# Patient Record
Sex: FEMALE | Race: WHITE | NOT HISPANIC OR LATINO | Employment: OTHER | URBAN - METROPOLITAN AREA
[De-identification: names, ages, dates, MRNs, and addresses within clinical notes are randomized per-mention and may not be internally consistent; named-entity substitution may affect disease eponyms.]

---

## 2024-01-01 ENCOUNTER — HOSPITAL ENCOUNTER (INPATIENT)
Facility: HOSPITAL | Age: 74
LOS: 2 days | End: 2024-08-29
Attending: EMERGENCY MEDICINE | Admitting: INTERNAL MEDICINE
Payer: COMMERCIAL

## 2024-01-01 ENCOUNTER — APPOINTMENT (OUTPATIENT)
Dept: CARDIOLOGY | Facility: HOSPITAL | Age: 74
End: 2024-01-01
Payer: COMMERCIAL

## 2024-01-01 ENCOUNTER — APPOINTMENT (OUTPATIENT)
Dept: RADIOLOGY | Facility: HOSPITAL | Age: 74
End: 2024-01-01
Payer: COMMERCIAL

## 2024-01-01 ENCOUNTER — APPOINTMENT (OUTPATIENT)
Dept: SURGERY | Facility: CLINIC | Age: 74
End: 2024-01-01
Payer: COMMERCIAL

## 2024-01-01 VITALS
WEIGHT: 61.2 LBS | DIASTOLIC BLOOD PRESSURE: 61 MMHG | TEMPERATURE: 95 F | OXYGEN SATURATION: 100 % | HEART RATE: 95 BPM | BODY MASS INDEX: 14.16 KG/M2 | RESPIRATION RATE: 16 BRPM | SYSTOLIC BLOOD PRESSURE: 98 MMHG | HEIGHT: 55 IN

## 2024-01-01 DIAGNOSIS — I48.91 A-FIB (MULTI): Primary | ICD-10-CM

## 2024-01-01 LAB
ABO GROUP (TYPE) IN BLOOD: NORMAL
ALBUMIN SERPL BCP-MCNC: 2 G/DL (ref 3.4–5)
ALBUMIN SERPL BCP-MCNC: 2.8 G/DL (ref 3.4–5)
ALP SERPL-CCNC: 125 U/L (ref 33–136)
ALP SERPL-CCNC: 90 U/L (ref 33–136)
ALT SERPL W P-5'-P-CCNC: 10 U/L (ref 7–45)
ALT SERPL W P-5'-P-CCNC: 11 U/L (ref 7–45)
ANION GAP BLDA CALCULATED.4IONS-SCNC: 16 MMO/L (ref 10–25)
ANION GAP BLDV CALCULATED.4IONS-SCNC: 17 MMOL/L (ref 10–25)
ANION GAP BLDV CALCULATED.4IONS-SCNC: 18 MMOL/L (ref 10–25)
ANION GAP SERPL CALC-SCNC: 14 MMOL/L (ref 10–20)
ANION GAP SERPL CALC-SCNC: 18 MMOL/L (ref 10–20)
ANTIBODY SCREEN: NORMAL
APTT PPP: 38 SECONDS (ref 27–38)
AST SERPL W P-5'-P-CCNC: 13 U/L (ref 9–39)
AST SERPL W P-5'-P-CCNC: 16 U/L (ref 9–39)
ATRIAL RATE: 0 BPM
ATRIAL RATE: 96 BPM
BASE EXCESS BLDA CALC-SCNC: -8.9 MMOL/L (ref -2–3)
BASE EXCESS BLDV CALC-SCNC: -5.7 MMOL/L (ref -2–3)
BASE EXCESS BLDV CALC-SCNC: -8.6 MMOL/L (ref -2–3)
BASOPHILS # BLD AUTO: 0.07 X10*3/UL (ref 0–0.1)
BASOPHILS # BLD AUTO: 0.11 X10*3/UL (ref 0–0.1)
BASOPHILS NFR BLD AUTO: 0.9 %
BASOPHILS NFR BLD AUTO: 1 %
BILIRUB DIRECT SERPL-MCNC: 0.3 MG/DL (ref 0–0.3)
BILIRUB SERPL-MCNC: 0.9 MG/DL (ref 0–1.2)
BILIRUB SERPL-MCNC: 1 MG/DL (ref 0–1.2)
BLOOD EXPIRATION DATE: NORMAL
BNP SERPL-MCNC: 269 PG/ML (ref 0–99)
BODY TEMPERATURE: 37 DEGREES CELSIUS
BUN SERPL-MCNC: 12 MG/DL (ref 6–23)
BUN SERPL-MCNC: 15 MG/DL (ref 6–23)
BURR CELLS BLD QL SMEAR: NORMAL
CA-I BLDA-SCNC: 1.16 MMOL/L (ref 1.1–1.33)
CA-I BLDV-SCNC: 1.14 MMOL/L (ref 1.1–1.33)
CA-I BLDV-SCNC: 1.23 MMOL/L (ref 1.1–1.33)
CALCIUM SERPL-MCNC: 7.2 MG/DL (ref 8.6–10.3)
CALCIUM SERPL-MCNC: 8 MG/DL (ref 8.6–10.3)
CARDIAC TROPONIN I PNL SERPL HS: 279 NG/L (ref 0–13)
CARDIAC TROPONIN I PNL SERPL HS: 300 NG/L (ref 0–13)
CARDIAC TROPONIN I PNL SERPL HS: 327 NG/L (ref 0–13)
CHLORIDE BLDA-SCNC: 115 MMOL/L (ref 98–107)
CHLORIDE BLDV-SCNC: 113 MMOL/L (ref 98–107)
CHLORIDE BLDV-SCNC: 115 MMOL/L (ref 98–107)
CHLORIDE SERPL-SCNC: 114 MMOL/L (ref 98–107)
CHLORIDE SERPL-SCNC: 118 MMOL/L (ref 98–107)
CO2 SERPL-SCNC: 15 MMOL/L (ref 21–32)
CO2 SERPL-SCNC: 18 MMOL/L (ref 21–32)
CREAT SERPL-MCNC: 0.71 MG/DL (ref 0.5–1.05)
CREAT SERPL-MCNC: 0.9 MG/DL (ref 0.5–1.05)
DISPENSE STATUS: NORMAL
EGFRCR SERPLBLD CKD-EPI 2021: 68 ML/MIN/1.73M*2
EGFRCR SERPLBLD CKD-EPI 2021: 90 ML/MIN/1.73M*2
EOSINOPHIL # BLD AUTO: 0.05 X10*3/UL (ref 0–0.4)
EOSINOPHIL # BLD AUTO: 0.07 X10*3/UL (ref 0–0.4)
EOSINOPHIL NFR BLD AUTO: 0.4 %
EOSINOPHIL NFR BLD AUTO: 0.9 %
ERYTHROCYTE [DISTWIDTH] IN BLOOD BY AUTOMATED COUNT: 17.6 % (ref 11.5–14.5)
ERYTHROCYTE [DISTWIDTH] IN BLOOD BY AUTOMATED COUNT: 17.7 % (ref 11.5–14.5)
GLUCOSE BLD MANUAL STRIP-MCNC: 102 MG/DL (ref 74–99)
GLUCOSE BLDA-MCNC: 111 MG/DL (ref 74–99)
GLUCOSE BLDV-MCNC: 125 MG/DL (ref 74–99)
GLUCOSE BLDV-MCNC: 85 MG/DL (ref 74–99)
GLUCOSE SERPL-MCNC: 129 MG/DL (ref 74–99)
GLUCOSE SERPL-MCNC: 88 MG/DL (ref 74–99)
HCO3 BLDA-SCNC: 15.1 MMOL/L (ref 22–26)
HCO3 BLDV-SCNC: 14.9 MMOL/L (ref 22–26)
HCO3 BLDV-SCNC: 19.3 MMOL/L (ref 22–26)
HCT VFR BLD AUTO: 19.9 % (ref 36–46)
HCT VFR BLD AUTO: 26.2 % (ref 36–46)
HCT VFR BLD EST: 21 % (ref 36–46)
HCT VFR BLD EST: 22 % (ref 36–46)
HCT VFR BLD EST: 26 % (ref 36–46)
HGB BLD-MCNC: 6.6 G/DL (ref 12–16)
HGB BLD-MCNC: 8.5 G/DL (ref 12–16)
HGB BLDA-MCNC: 7.3 G/DL (ref 12–16)
HGB BLDV-MCNC: 6.9 G/DL (ref 12–16)
HGB BLDV-MCNC: 8.6 G/DL (ref 12–16)
HYPOCHROMIA BLD QL SMEAR: NORMAL
IMM GRANULOCYTES # BLD AUTO: 0.06 X10*3/UL (ref 0–0.5)
IMM GRANULOCYTES # BLD AUTO: 0.16 X10*3/UL (ref 0–0.5)
IMM GRANULOCYTES NFR BLD AUTO: 0.8 % (ref 0–0.9)
IMM GRANULOCYTES NFR BLD AUTO: 1.4 % (ref 0–0.9)
INHALED O2 CONCENTRATION: 21 %
INHALED O2 CONCENTRATION: 28 %
INHALED O2 CONCENTRATION: 28 %
INR PPP: 1.6 (ref 0.9–1.1)
LACTATE BLDA-SCNC: 5.2 MMOL/L (ref 0.4–2)
LACTATE BLDV-SCNC: 1.7 MMOL/L (ref 0.4–2)
LACTATE BLDV-SCNC: 5.5 MMOL/L (ref 0.4–2)
LACTATE SERPL-SCNC: 5.2 MMOL/L (ref 0.4–2)
LACTATE SERPL-SCNC: 5.5 MMOL/L (ref 0.4–2)
LYMPHOCYTES # BLD AUTO: 0.47 X10*3/UL (ref 0.8–3)
LYMPHOCYTES # BLD AUTO: 1.52 X10*3/UL (ref 0.8–3)
LYMPHOCYTES NFR BLD AUTO: 13.6 %
LYMPHOCYTES NFR BLD AUTO: 6.3 %
MAGNESIUM SERPL-MCNC: 1.9 MG/DL (ref 1.6–2.4)
MCH RBC QN AUTO: 37.1 PG (ref 26–34)
MCH RBC QN AUTO: 37.3 PG (ref 26–34)
MCHC RBC AUTO-ENTMCNC: 32.4 G/DL (ref 32–36)
MCHC RBC AUTO-ENTMCNC: 33.2 G/DL (ref 32–36)
MCV RBC AUTO: 112 FL (ref 80–100)
MCV RBC AUTO: 114 FL (ref 80–100)
MONOCYTES # BLD AUTO: 0.74 X10*3/UL (ref 0.05–0.8)
MONOCYTES # BLD AUTO: 1.14 X10*3/UL (ref 0.05–0.8)
MONOCYTES NFR BLD AUTO: 10.2 %
MONOCYTES NFR BLD AUTO: 9.8 %
NEUTROPHILS # BLD AUTO: 6.11 X10*3/UL (ref 1.6–5.5)
NEUTROPHILS # BLD AUTO: 8.18 X10*3/UL (ref 1.6–5.5)
NEUTROPHILS NFR BLD AUTO: 73.4 %
NEUTROPHILS NFR BLD AUTO: 81.3 %
NRBC BLD-RTO: 0 /100 WBCS (ref 0–0)
NRBC BLD-RTO: 0 /100 WBCS (ref 0–0)
OXYHGB MFR BLDA: 97.7 % (ref 94–98)
OXYHGB MFR BLDV: 47.6 % (ref 45–75)
OXYHGB MFR BLDV: 97 % (ref 45–75)
P AXIS: 59 DEGREES
P AXIS: 68 DEGREES
PCO2 BLDA: 25 MM HG (ref 38–42)
PCO2 BLDV: 23 MM HG (ref 41–51)
PCO2 BLDV: 35 MM HG (ref 41–51)
PH BLDA: 7.39 PH (ref 7.38–7.42)
PH BLDV: 7.35 PH (ref 7.33–7.43)
PH BLDV: 7.42 PH (ref 7.33–7.43)
PLATELET # BLD AUTO: 174 X10*3/UL (ref 150–450)
PLATELET # BLD AUTO: 223 X10*3/UL (ref 150–450)
PO2 BLDA: 142 MM HG (ref 85–95)
PO2 BLDV: 148 MM HG (ref 35–45)
PO2 BLDV: 40 MM HG (ref 35–45)
POLYCHROMASIA BLD QL SMEAR: NORMAL
POTASSIUM BLDA-SCNC: 3.6 MMOL/L (ref 3.5–5.3)
POTASSIUM BLDV-SCNC: 2 MMOL/L (ref 3.5–5.3)
POTASSIUM BLDV-SCNC: 3.9 MMOL/L (ref 3.5–5.3)
POTASSIUM SERPL-SCNC: 2.2 MMOL/L (ref 3.5–5.3)
POTASSIUM SERPL-SCNC: 3.9 MMOL/L (ref 3.5–5.3)
PR INTERVAL: 82 MS
PR INTERVAL: 95 MS
PRODUCT BLOOD TYPE: 9500
PRODUCT CODE: NORMAL
PROT SERPL-MCNC: 3.7 G/DL (ref 6.4–8.2)
PROT SERPL-MCNC: 5.4 G/DL (ref 6.4–8.2)
PROTHROMBIN TIME: 18 SECONDS (ref 9.8–12.8)
Q ONSET: 252 MS
Q ONSET: 253 MS
QRS COUNT: 16 BEATS
QRS COUNT: 19 BEATS
QRS DURATION: 80 MS
QRS DURATION: 98 MS
QT INTERVAL: 320 MS
QT INTERVAL: 417 MS
QTC CALCULATION(BAZETT): 445 MS
QTC CALCULATION(BAZETT): 522 MS
QTC FREDERICIA: 398 MS
QTC FREDERICIA: 484 MS
R AXIS: 74 DEGREES
R AXIS: 83 DEGREES
RBC # BLD AUTO: 1.77 X10*6/UL (ref 4–5.2)
RBC # BLD AUTO: 2.29 X10*6/UL (ref 4–5.2)
RBC MORPH BLD: NORMAL
RH FACTOR (ANTIGEN D): NORMAL
SAO2 % BLDA: 100 % (ref 94–100)
SAO2 % BLDV: 49 % (ref 45–75)
SAO2 % BLDV: 99 % (ref 45–75)
SARS-COV-2 RNA RESP QL NAA+PROBE: NOT DETECTED
SODIUM BLDA-SCNC: 142 MMOL/L (ref 136–145)
SODIUM BLDV-SCNC: 142 MMOL/L (ref 136–145)
SODIUM BLDV-SCNC: 149 MMOL/L (ref 136–145)
SODIUM SERPL-SCNC: 143 MMOL/L (ref 136–145)
SODIUM SERPL-SCNC: 148 MMOL/L (ref 136–145)
T AXIS: -88 DEGREES
T OFFSET: 413 MS
T OFFSET: 461 MS
TARGETS BLD QL SMEAR: NORMAL
UNIT ABO: NORMAL
UNIT NUMBER: NORMAL
UNIT RH: NORMAL
UNIT VOLUME: 350
VENTRICULAR RATE: 116 BPM
VENTRICULAR RATE: 94 BPM
WBC # BLD AUTO: 11.2 X10*3/UL (ref 4.4–11.3)
WBC # BLD AUTO: 7.5 X10*3/UL (ref 4.4–11.3)
XM INTEP: NORMAL

## 2024-01-01 PROCEDURE — 96365 THER/PROPH/DIAG IV INF INIT: CPT

## 2024-01-01 PROCEDURE — 2500000005 HC RX 250 GENERAL PHARMACY W/O HCPCS: Performed by: EMERGENCY MEDICINE

## 2024-01-01 PROCEDURE — 93308 TTE F-UP OR LMTD: CPT | Performed by: EMERGENCY MEDICINE

## 2024-01-01 PROCEDURE — 87040 BLOOD CULTURE FOR BACTERIA: CPT | Mod: PORLAB | Performed by: EMERGENCY MEDICINE

## 2024-01-01 PROCEDURE — 84132 ASSAY OF SERUM POTASSIUM: CPT | Performed by: EMERGENCY MEDICINE

## 2024-01-01 PROCEDURE — 85025 COMPLETE CBC W/AUTO DIFF WBC: CPT | Performed by: EMERGENCY MEDICINE

## 2024-01-01 PROCEDURE — 71275 CT ANGIOGRAPHY CHEST: CPT

## 2024-01-01 PROCEDURE — 93005 ELECTROCARDIOGRAM TRACING: CPT

## 2024-01-01 PROCEDURE — 99223 1ST HOSP IP/OBS HIGH 75: CPT | Performed by: INTERNAL MEDICINE

## 2024-01-01 PROCEDURE — 2500000001 HC RX 250 WO HCPCS SELF ADMINISTERED DRUGS (ALT 637 FOR MEDICARE OP): Performed by: EMERGENCY MEDICINE

## 2024-01-01 PROCEDURE — 1210000001 HC SEMI-PRIVATE ROOM DAILY

## 2024-01-01 PROCEDURE — P9016 RBC LEUKOCYTES REDUCED: HCPCS

## 2024-01-01 PROCEDURE — 96376 TX/PRO/DX INJ SAME DRUG ADON: CPT

## 2024-01-01 PROCEDURE — 36415 COLL VENOUS BLD VENIPUNCTURE: CPT | Performed by: EMERGENCY MEDICINE

## 2024-01-01 PROCEDURE — 86901 BLOOD TYPING SEROLOGIC RH(D): CPT | Performed by: EMERGENCY MEDICINE

## 2024-01-01 PROCEDURE — 83605 ASSAY OF LACTIC ACID: CPT | Performed by: EMERGENCY MEDICINE

## 2024-01-01 PROCEDURE — 80053 COMPREHEN METABOLIC PANEL: CPT | Performed by: EMERGENCY MEDICINE

## 2024-01-01 PROCEDURE — 82810 BLOOD GASES O2 SAT ONLY: CPT | Performed by: EMERGENCY MEDICINE

## 2024-01-01 PROCEDURE — 74177 CT ABD & PELVIS W/CONTRAST: CPT

## 2024-01-01 PROCEDURE — 82947 ASSAY GLUCOSE BLOOD QUANT: CPT

## 2024-01-01 PROCEDURE — 71045 X-RAY EXAM CHEST 1 VIEW: CPT | Mod: FOREIGN READ | Performed by: RADIOLOGY

## 2024-01-01 PROCEDURE — 96361 HYDRATE IV INFUSION ADD-ON: CPT

## 2024-01-01 PROCEDURE — 83735 ASSAY OF MAGNESIUM: CPT | Performed by: EMERGENCY MEDICINE

## 2024-01-01 PROCEDURE — 2500000004 HC RX 250 GENERAL PHARMACY W/ HCPCS (ALT 636 FOR OP/ED)

## 2024-01-01 PROCEDURE — 36430 TRANSFUSION BLD/BLD COMPNT: CPT

## 2024-01-01 PROCEDURE — 96375 TX/PRO/DX INJ NEW DRUG ADDON: CPT

## 2024-01-01 PROCEDURE — 5A2204Z RESTORATION OF CARDIAC RHYTHM, SINGLE: ICD-10-PCS | Performed by: EMERGENCY MEDICINE

## 2024-01-01 PROCEDURE — 86920 COMPATIBILITY TEST SPIN: CPT

## 2024-01-01 PROCEDURE — 99232 SBSQ HOSP IP/OBS MODERATE 35: CPT | Performed by: FAMILY MEDICINE

## 2024-01-01 PROCEDURE — 74177 CT ABD & PELVIS W/CONTRAST: CPT | Performed by: RADIOLOGY

## 2024-01-01 PROCEDURE — 85610 PROTHROMBIN TIME: CPT | Performed by: EMERGENCY MEDICINE

## 2024-01-01 PROCEDURE — 71045 X-RAY EXAM CHEST 1 VIEW: CPT

## 2024-01-01 PROCEDURE — 82435 ASSAY OF BLOOD CHLORIDE: CPT | Performed by: EMERGENCY MEDICINE

## 2024-01-01 PROCEDURE — 82330 ASSAY OF CALCIUM: CPT | Performed by: EMERGENCY MEDICINE

## 2024-01-01 PROCEDURE — 2500000004 HC RX 250 GENERAL PHARMACY W/ HCPCS (ALT 636 FOR OP/ED): Performed by: INTERNAL MEDICINE

## 2024-01-01 PROCEDURE — 96366 THER/PROPH/DIAG IV INF ADDON: CPT

## 2024-01-01 PROCEDURE — 87635 SARS-COV-2 COVID-19 AMP PRB: CPT | Performed by: EMERGENCY MEDICINE

## 2024-01-01 PROCEDURE — 2550000001 HC RX 255 CONTRASTS: Performed by: EMERGENCY MEDICINE

## 2024-01-01 PROCEDURE — 84484 ASSAY OF TROPONIN QUANT: CPT | Performed by: EMERGENCY MEDICINE

## 2024-01-01 PROCEDURE — 71275 CT ANGIOGRAPHY CHEST: CPT | Performed by: RADIOLOGY

## 2024-01-01 PROCEDURE — 2500000004 HC RX 250 GENERAL PHARMACY W/ HCPCS (ALT 636 FOR OP/ED): Performed by: EMERGENCY MEDICINE

## 2024-01-01 PROCEDURE — 96367 TX/PROPH/DG ADDL SEQ IV INF: CPT

## 2024-01-01 PROCEDURE — 83880 ASSAY OF NATRIURETIC PEPTIDE: CPT | Performed by: EMERGENCY MEDICINE

## 2024-01-01 PROCEDURE — 2500000005 HC RX 250 GENERAL PHARMACY W/O HCPCS

## 2024-01-01 PROCEDURE — 82248 BILIRUBIN DIRECT: CPT | Performed by: EMERGENCY MEDICINE

## 2024-01-01 PROCEDURE — 36620 INSERTION CATHETER ARTERY: CPT | Performed by: EMERGENCY MEDICINE

## 2024-01-01 PROCEDURE — 02HV33Z INSERTION OF INFUSION DEVICE INTO SUPERIOR VENA CAVA, PERCUTANEOUS APPROACH: ICD-10-PCS | Performed by: EMERGENCY MEDICINE

## 2024-01-01 PROCEDURE — 96368 THER/DIAG CONCURRENT INF: CPT

## 2024-01-01 PROCEDURE — 37799 UNLISTED PX VASCULAR SURGERY: CPT | Performed by: EMERGENCY MEDICINE

## 2024-01-01 PROCEDURE — 36556 INSERT NON-TUNNEL CV CATH: CPT | Performed by: EMERGENCY MEDICINE

## 2024-01-01 PROCEDURE — 99291 CRITICAL CARE FIRST HOUR: CPT | Performed by: EMERGENCY MEDICINE

## 2024-01-01 RX ORDER — HEPARIN SODIUM 10000 [USP'U]/100ML
INJECTION, SOLUTION INTRAVENOUS
Status: COMPLETED
Start: 2024-01-01 | End: 2024-01-01

## 2024-01-01 RX ORDER — POTASSIUM CHLORIDE 1.5 G/1.58G
40 POWDER, FOR SOLUTION ORAL ONCE
Status: DISCONTINUED | OUTPATIENT
Start: 2024-01-01 | End: 2024-01-01

## 2024-01-01 RX ORDER — POTASSIUM CHLORIDE 14.9 MG/ML
20 INJECTION INTRAVENOUS ONCE
Status: COMPLETED | OUTPATIENT
Start: 2024-01-01 | End: 2024-01-01

## 2024-01-01 RX ORDER — MIDAZOLAM HYDROCHLORIDE 1 MG/ML
INJECTION, SOLUTION INTRAMUSCULAR; INTRAVENOUS
Status: COMPLETED
Start: 2024-01-01 | End: 2024-01-01

## 2024-01-01 RX ORDER — LORAZEPAM 2 MG/ML
2 INJECTION INTRAMUSCULAR EVERY 10 MIN PRN
Status: COMPLETED | OUTPATIENT
Start: 2024-01-01 | End: 2024-01-01

## 2024-01-01 RX ORDER — VANCOMYCIN HYDROCHLORIDE 1 G/200ML
1 INJECTION, SOLUTION INTRAVENOUS ONCE
Status: DISCONTINUED | OUTPATIENT
Start: 2024-01-01 | End: 2024-01-01 | Stop reason: DRUGHIGH

## 2024-01-01 RX ORDER — POTASSIUM CHLORIDE 29.8 MG/ML
40 INJECTION INTRAVENOUS ONCE
Status: DISCONTINUED | OUTPATIENT
Start: 2024-01-01 | End: 2024-01-01

## 2024-01-01 RX ORDER — VANCOMYCIN HYDROCHLORIDE 750 MG/150ML
750 INJECTION, SOLUTION INTRAVENOUS ONCE
Status: COMPLETED | OUTPATIENT
Start: 2024-01-01 | End: 2024-01-01

## 2024-01-01 RX ORDER — SODIUM BICARBONATE 1 MEQ/ML
25 SYRINGE (ML) INTRAVENOUS ONCE
Status: COMPLETED | OUTPATIENT
Start: 2024-01-01 | End: 2024-01-01

## 2024-01-01 RX ORDER — GLYCOPYRROLATE 0.2 MG/ML
0.2 INJECTION INTRAMUSCULAR; INTRAVENOUS EVERY 4 HOURS PRN
Status: DISCONTINUED | OUTPATIENT
Start: 2024-01-01 | End: 2024-08-29 | Stop reason: HOSPADM

## 2024-01-01 RX ORDER — MORPHINE SULFATE 2 MG/ML
INJECTION, SOLUTION INTRAMUSCULAR; INTRAVENOUS
Status: COMPLETED
Start: 2024-01-01 | End: 2024-01-01

## 2024-01-01 RX ORDER — ACETAMINOPHEN 650 MG/1
650 SUPPOSITORY RECTAL EVERY 4 HOURS PRN
Status: DISCONTINUED | OUTPATIENT
Start: 2024-01-01 | End: 2024-08-29 | Stop reason: HOSPADM

## 2024-01-01 RX ORDER — MIDAZOLAM HYDROCHLORIDE 1 MG/ML
1 INJECTION, SOLUTION INTRAMUSCULAR; INTRAVENOUS ONCE
Status: COMPLETED | OUTPATIENT
Start: 2024-01-01 | End: 2024-01-01

## 2024-01-01 RX ORDER — NOREPINEPHRINE BITARTRATE/D5W 8 MG/250ML
0-.3 PLASTIC BAG, INJECTION (ML) INTRAVENOUS CONTINUOUS
Status: DISCONTINUED | OUTPATIENT
Start: 2024-01-01 | End: 2024-08-29 | Stop reason: HOSPADM

## 2024-01-01 RX ORDER — MIDAZOLAM HYDROCHLORIDE 1 MG/ML
2 INJECTION, SOLUTION INTRAMUSCULAR; INTRAVENOUS ONCE
Status: COMPLETED | OUTPATIENT
Start: 2024-01-01 | End: 2024-01-01

## 2024-01-01 RX ORDER — NOREPINEPHRINE BITARTRATE/D5W 8 MG/250ML
PLASTIC BAG, INJECTION (ML) INTRAVENOUS
Status: COMPLETED
Start: 2024-01-01 | End: 2024-01-01

## 2024-01-01 RX ORDER — MORPHINE SULFATE 2 MG/ML
2 INJECTION, SOLUTION INTRAMUSCULAR; INTRAVENOUS
Status: DISCONTINUED | OUTPATIENT
Start: 2024-01-01 | End: 2024-08-29 | Stop reason: HOSPADM

## 2024-01-01 RX ORDER — METOPROLOL TARTRATE 1 MG/ML
5 INJECTION, SOLUTION INTRAVENOUS ONCE
Status: COMPLETED | OUTPATIENT
Start: 2024-01-01 | End: 2024-01-01

## 2024-01-01 RX ORDER — HEPARIN SODIUM 10000 [USP'U]/100ML
0-4500 INJECTION, SOLUTION INTRAVENOUS CONTINUOUS
Status: DISCONTINUED | OUTPATIENT
Start: 2024-01-01 | End: 2024-01-01

## 2024-01-01 RX ORDER — LORAZEPAM 2 MG/ML
0.5 INJECTION INTRAMUSCULAR EVERY 4 HOURS PRN
Status: DISCONTINUED | OUTPATIENT
Start: 2024-01-01 | End: 2024-08-29 | Stop reason: HOSPADM

## 2024-01-01 RX ORDER — AMIODARONE HYDROCHLORIDE 150 MG/3ML
INJECTION, SOLUTION INTRAVENOUS
Status: DISCONTINUED
Start: 2024-01-01 | End: 2024-01-01 | Stop reason: WASHOUT

## 2024-01-01 RX ORDER — METOPROLOL TARTRATE 1 MG/ML
INJECTION, SOLUTION INTRAVENOUS
Status: COMPLETED
Start: 2024-01-01 | End: 2024-01-01

## 2024-01-01 RX ORDER — MORPHINE SULFATE 4 MG/ML
4 INJECTION INTRAVENOUS
Status: DISCONTINUED | OUTPATIENT
Start: 2024-01-01 | End: 2024-08-29 | Stop reason: HOSPADM

## 2024-01-01 RX ORDER — ATROPINE SULFATE 10 MG/ML
1 SOLUTION/ DROPS OPHTHALMIC EVERY 8 HOURS PRN
Status: DISCONTINUED | OUTPATIENT
Start: 2024-01-01 | End: 2024-08-29 | Stop reason: HOSPADM

## 2024-01-01 RX ORDER — ONDANSETRON HYDROCHLORIDE 2 MG/ML
4 INJECTION, SOLUTION INTRAVENOUS EVERY 6 HOURS PRN
Status: DISCONTINUED | OUTPATIENT
Start: 2024-01-01 | End: 2024-08-29 | Stop reason: HOSPADM

## 2024-01-01 RX ORDER — SODIUM CHLORIDE, SODIUM LACTATE, POTASSIUM CHLORIDE, CALCIUM CHLORIDE 600; 310; 30; 20 MG/100ML; MG/100ML; MG/100ML; MG/100ML
100 INJECTION, SOLUTION INTRAVENOUS CONTINUOUS
Status: DISCONTINUED | OUTPATIENT
Start: 2024-01-01 | End: 2024-08-29 | Stop reason: HOSPADM

## 2024-01-01 RX ORDER — HALOPERIDOL 5 MG/ML
1 INJECTION INTRAMUSCULAR EVERY 4 HOURS PRN
Status: DISCONTINUED | OUTPATIENT
Start: 2024-01-01 | End: 2024-08-29 | Stop reason: HOSPADM

## 2024-01-01 SDOH — ECONOMIC STABILITY: TRANSPORTATION INSECURITY
IN THE PAST 12 MONTHS, HAS THE LACK OF TRANSPORTATION KEPT YOU FROM MEDICAL APPOINTMENTS OR FROM GETTING MEDICATIONS?: PATIENT UNABLE TO ANSWER

## 2024-01-01 SDOH — ECONOMIC STABILITY: INCOME INSECURITY
IN THE LAST 12 MONTHS, WAS THERE A TIME WHEN YOU WERE NOT ABLE TO PAY THE MORTGAGE OR RENT ON TIME?: PATIENT UNABLE TO ANSWER

## 2024-01-01 SDOH — SOCIAL STABILITY: SOCIAL INSECURITY: ABUSE: ADULT

## 2024-01-01 SDOH — SOCIAL STABILITY: SOCIAL INSECURITY: HAVE YOU HAD THOUGHTS OF HARMING ANYONE ELSE?: UNABLE TO ASSESS

## 2024-01-01 SDOH — SOCIAL STABILITY: SOCIAL INSECURITY: DO YOU FEEL ANYONE HAS EXPLOITED OR TAKEN ADVANTAGE OF YOU FINANCIALLY OR OF YOUR PERSONAL PROPERTY?: UNABLE TO ASSESS

## 2024-01-01 SDOH — ECONOMIC STABILITY: TRANSPORTATION INSECURITY
IN THE PAST 12 MONTHS, HAS LACK OF TRANSPORTATION KEPT YOU FROM MEETINGS, WORK, OR FROM GETTING THINGS NEEDED FOR DAILY LIVING?: PATIENT UNABLE TO ANSWER

## 2024-01-01 SDOH — ECONOMIC STABILITY: HOUSING INSECURITY: AT ANY TIME IN THE PAST 12 MONTHS, WERE YOU HOMELESS OR LIVING IN A SHELTER (INCLUDING NOW)?: PATIENT UNABLE TO ANSWER

## 2024-01-01 SDOH — SOCIAL STABILITY: SOCIAL INSECURITY: DO YOU FEEL UNSAFE GOING BACK TO THE PLACE WHERE YOU ARE LIVING?: UNABLE TO ASSESS

## 2024-01-01 SDOH — ECONOMIC STABILITY: INCOME INSECURITY
HOW HARD IS IT FOR YOU TO PAY FOR THE VERY BASICS LIKE FOOD, HOUSING, MEDICAL CARE, AND HEATING?: PATIENT UNABLE TO ANSWER

## 2024-01-01 SDOH — SOCIAL STABILITY: SOCIAL INSECURITY: ARE THERE ANY APPARENT SIGNS OF INJURIES/BEHAVIORS THAT COULD BE RELATED TO ABUSE/NEGLECT?: UNABLE TO ASSESS

## 2024-01-01 SDOH — SOCIAL STABILITY: SOCIAL INSECURITY: HAVE YOU HAD ANY THOUGHTS OF HARMING ANYONE ELSE?: UNABLE TO ASSESS

## 2024-01-01 SDOH — SOCIAL STABILITY: SOCIAL INSECURITY: HAS ANYONE EVER THREATENED TO HURT YOUR FAMILY OR YOUR PETS?: UNABLE TO ASSESS

## 2024-01-01 SDOH — SOCIAL STABILITY: SOCIAL INSECURITY: ARE YOU OR HAVE YOU BEEN THREATENED OR ABUSED PHYSICALLY, EMOTIONALLY, OR SEXUALLY BY ANYONE?: UNABLE TO ASSESS

## 2024-01-01 SDOH — SOCIAL STABILITY: SOCIAL INSECURITY: DOES ANYONE TRY TO KEEP YOU FROM HAVING/CONTACTING OTHER FRIENDS OR DOING THINGS OUTSIDE YOUR HOME?: UNABLE TO ASSESS

## 2024-01-01 SDOH — ECONOMIC STABILITY: HOUSING INSECURITY: IN THE PAST 12 MONTHS, HOW MANY TIMES HAVE YOU MOVED WHERE YOU WERE LIVING?: 0

## 2024-01-01 ASSESSMENT — COGNITIVE AND FUNCTIONAL STATUS - GENERAL
CLIMB 3 TO 5 STEPS WITH RAILING: TOTAL
PATIENT BASELINE BEDBOUND: YES
TOILETING: TOTAL
MOVING FROM LYING ON BACK TO SITTING ON SIDE OF FLAT BED WITH BEDRAILS: TOTAL
DAILY ACTIVITIY SCORE: 6
HELP NEEDED FOR BATHING: TOTAL
DAILY ACTIVITIY SCORE: 6
TOILETING: TOTAL
DRESSING REGULAR LOWER BODY CLOTHING: TOTAL
HELP NEEDED FOR BATHING: TOTAL
DRESSING REGULAR UPPER BODY CLOTHING: TOTAL
TURNING FROM BACK TO SIDE WHILE IN FLAT BAD: TOTAL
EATING MEALS: TOTAL
PERSONAL GROOMING: TOTAL
WALKING IN HOSPITAL ROOM: TOTAL
MOVING TO AND FROM BED TO CHAIR: TOTAL
TOILETING: TOTAL
CLIMB 3 TO 5 STEPS WITH RAILING: TOTAL
EATING MEALS: TOTAL
STANDING UP FROM CHAIR USING ARMS: TOTAL
DRESSING REGULAR UPPER BODY CLOTHING: TOTAL
STANDING UP FROM CHAIR USING ARMS: TOTAL
PERSONAL GROOMING: TOTAL
TURNING FROM BACK TO SIDE WHILE IN FLAT BAD: TOTAL
EATING MEALS: TOTAL
PERSONAL GROOMING: TOTAL
MOVING TO AND FROM BED TO CHAIR: TOTAL
HELP NEEDED FOR BATHING: TOTAL
MOVING FROM LYING ON BACK TO SITTING ON SIDE OF FLAT BED WITH BEDRAILS: TOTAL
MOBILITY SCORE: 6
TURNING FROM BACK TO SIDE WHILE IN FLAT BAD: TOTAL
WALKING IN HOSPITAL ROOM: TOTAL
MOVING FROM LYING ON BACK TO SITTING ON SIDE OF FLAT BED WITH BEDRAILS: TOTAL
STANDING UP FROM CHAIR USING ARMS: TOTAL
MOVING TO AND FROM BED TO CHAIR: TOTAL
DRESSING REGULAR LOWER BODY CLOTHING: TOTAL
MOBILITY SCORE: 6
CLIMB 3 TO 5 STEPS WITH RAILING: TOTAL
DAILY ACTIVITIY SCORE: 6
WALKING IN HOSPITAL ROOM: TOTAL
DRESSING REGULAR UPPER BODY CLOTHING: TOTAL
DRESSING REGULAR LOWER BODY CLOTHING: TOTAL
MOBILITY SCORE: 6

## 2024-01-01 ASSESSMENT — ACTIVITIES OF DAILY LIVING (ADL)
WALKS IN HOME: UNABLE TO ASSESS
WALKS IN HOME: DEPENDENT
DRESSING YOURSELF: DEPENDENT
ADEQUATE_TO_COMPLETE_ADL: UNABLE TO ASSESS
HEARING - RIGHT EAR: UNABLE TO ASSESS
HEARING - RIGHT EAR: UNABLE TO ASSESS
PATIENT'S MEMORY ADEQUATE TO SAFELY COMPLETE DAILY ACTIVITIES?: UNABLE TO ASSESS
LACK_OF_TRANSPORTATION: PATIENT UNABLE TO ANSWER
JUDGMENT_ADEQUATE_SAFELY_COMPLETE_DAILY_ACTIVITIES: UNABLE TO ASSESS
DRESSING YOURSELF: DEPENDENT
HEARING - LEFT EAR: UNABLE TO ASSESS
FEEDING YOURSELF: DEPENDENT
TOILETING: DEPENDENT
JUDGMENT_ADEQUATE_SAFELY_COMPLETE_DAILY_ACTIVITIES: UNABLE TO ASSESS
BATHING: DEPENDENT
BATHING: DEPENDENT
PATIENT'S MEMORY ADEQUATE TO SAFELY COMPLETE DAILY ACTIVITIES?: UNABLE TO ASSESS
TOILETING: DEPENDENT
ADEQUATE_TO_COMPLETE_ADL: UNABLE TO ASSESS
HEARING - LEFT EAR: UNABLE TO ASSESS
GROOMING: DEPENDENT
GROOMING: DEPENDENT
FEEDING YOURSELF: DEPENDENT

## 2024-01-01 ASSESSMENT — PAIN SCALES - PAIN ASSESSMENT IN ADVANCED DEMENTIA (PAINAD)
BREATHING: NORMAL
BODYLANGUAGE: RELAXED
TOTALSCORE: 1
TOTALSCORE: 4
FACIALEXPRESSION: SAD, FRIGHTENED, FROWN
TOTALSCORE: REST
BODYLANGUAGE: TENSE, DISTRESSED PACING, FIDGETING
FACIALEXPRESSION: SAD, FRIGHTENED, FROWN
TOTALSCORE: 0
BREATHING: NORMAL
FACIALEXPRESSION: SMILING OR INEXPRESSIVE
BREATHING: NORMAL
TOTALSCORE: MEDICATION (SEE MAR);REPOSITIONED
NEGVOCALIZATION: OCCASIONAL MOAN/GROAN, LOW SPEECH, NEGATIVE/DISAPPROVING QUALITY
CONSOLABILITY: NO NEED TO CONSOLE
CONSOLABILITY: DISTRACTED OR REASSURED BY VOICE/TOUCH
BODYLANGUAGE: RELAXED
CONSOLABILITY: NO NEED TO CONSOLE

## 2024-01-01 ASSESSMENT — LIFESTYLE VARIABLES
HOW MANY STANDARD DRINKS CONTAINING ALCOHOL DO YOU HAVE ON A TYPICAL DAY: PATIENT UNABLE TO ANSWER
EVER FELT BAD OR GUILTY ABOUT YOUR DRINKING: NO
AUDIT-C TOTAL SCORE: -1
AUDIT-C TOTAL SCORE: -1
TOTAL SCORE: 0
EVER HAD A DRINK FIRST THING IN THE MORNING TO STEADY YOUR NERVES TO GET RID OF A HANGOVER: NO
HOW OFTEN DO YOU HAVE 6 OR MORE DRINKS ON ONE OCCASION: PATIENT UNABLE TO ANSWER
HAVE PEOPLE ANNOYED YOU BY CRITICIZING YOUR DRINKING: NO
HOW OFTEN DO YOU HAVE A DRINK CONTAINING ALCOHOL: PATIENT UNABLE TO ANSWER
HAVE YOU EVER FELT YOU SHOULD CUT DOWN ON YOUR DRINKING: NO
SKIP TO QUESTIONS 9-10: 0

## 2024-01-01 ASSESSMENT — PAIN SCALES - GENERAL
PAINLEVEL_OUTOF10: 0 - NO PAIN
PAINLEVEL_OUTOF10: 0 - NO PAIN
PAINLEVEL_OUTOF10: 6
PAINLEVEL_OUTOF10: 0 - NO PAIN
PAINLEVEL_OUTOF10: 6

## 2024-01-01 ASSESSMENT — PATIENT HEALTH QUESTIONNAIRE - PHQ9
1. LITTLE INTEREST OR PLEASURE IN DOING THINGS: NOT AT ALL
SUM OF ALL RESPONSES TO PHQ9 QUESTIONS 1 & 2: 0
2. FEELING DOWN, DEPRESSED OR HOPELESS: NOT AT ALL

## 2024-01-01 ASSESSMENT — COLUMBIA-SUICIDE SEVERITY RATING SCALE - C-SSRS
6. HAVE YOU EVER DONE ANYTHING, STARTED TO DO ANYTHING, OR PREPARED TO DO ANYTHING TO END YOUR LIFE?: NO
1. IN THE PAST MONTH, HAVE YOU WISHED YOU WERE DEAD OR WISHED YOU COULD GO TO SLEEP AND NOT WAKE UP?: NO
2. HAVE YOU ACTUALLY HAD ANY THOUGHTS OF KILLING YOURSELF?: NO

## 2024-01-01 ASSESSMENT — PAIN - FUNCTIONAL ASSESSMENT
PAIN_FUNCTIONAL_ASSESSMENT: CPOT (CRITICAL CARE PAIN OBSERVATION TOOL)
PAIN_FUNCTIONAL_ASSESSMENT: PAINAD (PAIN ASSESSMENT IN ADVANCED DEMENTIA SCALE)
PAIN_FUNCTIONAL_ASSESSMENT: 0-10
PAIN_FUNCTIONAL_ASSESSMENT: PAINAD (PAIN ASSESSMENT IN ADVANCED DEMENTIA SCALE)
PAIN_FUNCTIONAL_ASSESSMENT: PAINAD (PAIN ASSESSMENT IN ADVANCED DEMENTIA SCALE)

## 2024-07-19 ENCOUNTER — APPOINTMENT (OUTPATIENT)
Dept: RADIOLOGY | Facility: HOSPITAL | Age: 74
End: 2024-07-19
Payer: COMMERCIAL

## 2024-07-19 ENCOUNTER — HOSPITAL ENCOUNTER (EMERGENCY)
Facility: HOSPITAL | Age: 74
Discharge: HOME | End: 2024-07-19
Attending: EMERGENCY MEDICINE
Payer: COMMERCIAL

## 2024-07-19 VITALS
HEART RATE: 105 BPM | DIASTOLIC BLOOD PRESSURE: 89 MMHG | RESPIRATION RATE: 18 BRPM | TEMPERATURE: 98.3 F | SYSTOLIC BLOOD PRESSURE: 143 MMHG | OXYGEN SATURATION: 95 %

## 2024-07-19 DIAGNOSIS — S32.019A CLOSED FRACTURE OF FIRST LUMBAR VERTEBRA, UNSPECIFIED FRACTURE MORPHOLOGY, INITIAL ENCOUNTER (MULTI): ICD-10-CM
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
## 2024-07-19 DIAGNOSIS — S12.9XXA: ICD-10-CM

## 2024-07-19 LAB
ABO GROUP (TYPE) IN BLOOD: NORMAL
ALBUMIN SERPL BCP-MCNC: 3.1 G/DL (ref 3.4–5)
ALP SERPL-CCNC: 172 U/L (ref 33–136)
ALT SERPL W P-5'-P-CCNC: 36 U/L (ref 7–45)
ANION GAP SERPL CALC-SCNC: 10 MMOL/L (ref 10–20)
ANTIBODY SCREEN: NORMAL
AST SERPL W P-5'-P-CCNC: 23 U/L (ref 9–39)
BASOPHILS # BLD AUTO: 0.03 X10*3/UL (ref 0–0.1)
BASOPHILS NFR BLD AUTO: 0.2 %
BILIRUB SERPL-MCNC: 0.8 MG/DL (ref 0–1.2)
BUN SERPL-MCNC: 21 MG/DL (ref 6–23)
CALCIUM SERPL-MCNC: 7.9 MG/DL (ref 8.6–10.3)
CHLORIDE SERPL-SCNC: 105 MMOL/L (ref 98–107)
CO2 SERPL-SCNC: 24 MMOL/L (ref 21–32)
CREAT SERPL-MCNC: 0.51 MG/DL (ref 0.5–1.05)
EGFRCR SERPLBLD CKD-EPI 2021: >90 ML/MIN/1.73M*2
EOSINOPHIL # BLD AUTO: 0.02 X10*3/UL (ref 0–0.4)
EOSINOPHIL NFR BLD AUTO: 0.2 %
ERYTHROCYTE [DISTWIDTH] IN BLOOD BY AUTOMATED COUNT: 14 % (ref 11.5–14.5)
ETHANOL SERPL-MCNC: <10 MG/DL
GLUCOSE SERPL-MCNC: 111 MG/DL (ref 74–99)
HCT VFR BLD AUTO: 25 % (ref 36–46)
HGB BLD-MCNC: 8.4 G/DL (ref 12–16)
IMM GRANULOCYTES # BLD AUTO: 0.22 X10*3/UL (ref 0–0.5)
IMM GRANULOCYTES NFR BLD AUTO: 1.7 % (ref 0–0.9)
INR PPP: 1.3 (ref 0.9–1.1)
LACTATE SERPL-SCNC: 1.3 MMOL/L (ref 0.4–2)
LYMPHOCYTES # BLD AUTO: 0.23 X10*3/UL (ref 0.8–3)
LYMPHOCYTES NFR BLD AUTO: 1.8 %
MCH RBC QN AUTO: 36.4 PG (ref 26–34)
MCHC RBC AUTO-ENTMCNC: 33.6 G/DL (ref 32–36)
MCV RBC AUTO: 108 FL (ref 80–100)
MONOCYTES # BLD AUTO: 1.02 X10*3/UL (ref 0.05–0.8)
MONOCYTES NFR BLD AUTO: 7.8 %
NEUTROPHILS # BLD AUTO: 11.49 X10*3/UL (ref 1.6–5.5)
NEUTROPHILS NFR BLD AUTO: 88.3 %
NRBC BLD-RTO: 0 /100 WBCS (ref 0–0)
PLATELET # BLD AUTO: 184 X10*3/UL (ref 150–450)
POTASSIUM SERPL-SCNC: 3.6 MMOL/L (ref 3.5–5.3)
PROT SERPL-MCNC: 5.6 G/DL (ref 6.4–8.2)
PROTHROMBIN TIME: 14.6 SECONDS (ref 9.8–12.8)
RBC # BLD AUTO: 2.31 X10*6/UL (ref 4–5.2)
RH FACTOR (ANTIGEN D): NORMAL
SODIUM SERPL-SCNC: 135 MMOL/L (ref 136–145)
WBC # BLD AUTO: 13 X10*3/UL (ref 4.4–11.3)

## 2024-07-19 PROCEDURE — 99285 EMERGENCY DEPT VISIT HI MDM: CPT | Mod: 25

## 2024-07-19 PROCEDURE — 82077 ASSAY SPEC XCP UR&BREATH IA: CPT | Performed by: EMERGENCY MEDICINE

## 2024-07-19 PROCEDURE — 72125 CT NECK SPINE W/O DYE: CPT | Performed by: RADIOLOGY

## 2024-07-19 PROCEDURE — 86901 BLOOD TYPING SEROLOGIC RH(D): CPT | Performed by: EMERGENCY MEDICINE

## 2024-07-19 PROCEDURE — 74177 CT ABD & PELVIS W/CONTRAST: CPT | Performed by: RADIOLOGY

## 2024-07-19 PROCEDURE — 36415 COLL VENOUS BLD VENIPUNCTURE: CPT | Performed by: EMERGENCY MEDICINE

## 2024-07-19 PROCEDURE — 72131 CT LUMBAR SPINE W/O DYE: CPT | Performed by: RADIOLOGY

## 2024-07-19 PROCEDURE — 70450 CT HEAD/BRAIN W/O DYE: CPT | Performed by: RADIOLOGY

## 2024-07-19 PROCEDURE — 72131 CT LUMBAR SPINE W/O DYE: CPT | Mod: RSC

## 2024-07-19 PROCEDURE — 74177 CT ABD & PELVIS W/CONTRAST: CPT

## 2024-07-19 PROCEDURE — 72128 CT CHEST SPINE W/O DYE: CPT | Mod: RSC

## 2024-07-19 PROCEDURE — 71045 X-RAY EXAM CHEST 1 VIEW: CPT | Performed by: RADIOLOGY

## 2024-07-19 PROCEDURE — 85025 COMPLETE CBC W/AUTO DIFF WBC: CPT | Performed by: EMERGENCY MEDICINE

## 2024-07-19 PROCEDURE — 85610 PROTHROMBIN TIME: CPT | Performed by: EMERGENCY MEDICINE

## 2024-07-19 PROCEDURE — 2550000001 HC RX 255 CONTRASTS: Performed by: EMERGENCY MEDICINE

## 2024-07-19 PROCEDURE — 72128 CT CHEST SPINE W/O DYE: CPT | Performed by: RADIOLOGY

## 2024-07-19 PROCEDURE — 70450 CT HEAD/BRAIN W/O DYE: CPT

## 2024-07-19 PROCEDURE — 83605 ASSAY OF LACTIC ACID: CPT | Performed by: EMERGENCY MEDICINE

## 2024-07-19 PROCEDURE — 71045 X-RAY EXAM CHEST 1 VIEW: CPT

## 2024-07-19 PROCEDURE — 72125 CT NECK SPINE W/O DYE: CPT

## 2024-07-19 PROCEDURE — 71260 CT THORAX DX C+: CPT | Performed by: RADIOLOGY

## 2024-07-19 PROCEDURE — 84075 ASSAY ALKALINE PHOSPHATASE: CPT | Performed by: EMERGENCY MEDICINE

## 2024-07-19 RX ORDER — HYDROCODONE BITARTRATE AND ACETAMINOPHEN 5; 325 MG/1; MG/1
1 TABLET ORAL ONCE
Status: DISCONTINUED | OUTPATIENT
Start: 2024-07-19 | End: 2024-07-19 | Stop reason: HOSPADM

## 2024-07-19 ASSESSMENT — COLUMBIA-SUICIDE SEVERITY RATING SCALE - C-SSRS
2. HAVE YOU ACTUALLY HAD ANY THOUGHTS OF KILLING YOURSELF?: NO
6. HAVE YOU EVER DONE ANYTHING, STARTED TO DO ANYTHING, OR PREPARED TO DO ANYTHING TO END YOUR LIFE?: NO
1. IN THE PAST MONTH, HAVE YOU WISHED YOU WERE DEAD OR WISHED YOU COULD GO TO SLEEP AND NOT WAKE UP?: NO

## 2024-07-19 NOTE — ED PROVIDER NOTES
The patient's case was signed out to me by the outgoing physician.  CAT scan the patient's head demonstrated no significant acute intracranial abnormalities.  CAT scan of the patient's cervical spine demonstrated spinous process fractures of C7 and C6 CAT scan the patient's thoracic spine demonstrated no significant osseous abnormalities CAT scan the patient's lumbar spine demonstrated no osseous abnormalities.  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    Patient presents to the emergency department after a fall.  Workup was performed as above and demonstrates 2 spinous process fractures.  Although the patient has a significant abnormality in the right side of her chest a large mass could be seen on CAT scan, on chart review, this is a known issue for the patient and has been biopsied.  The patient is being evaluated as an outpatient for this and as result, there is no action which seems to be carried out at this time for this note onto known ongoing issue.  The patient was reassured I explained that she had 2 spinous process fractures but otherwise appears well as result I am comfortable the patient being discharged home at this time.  She was instructed to follow-up with a primary care physician as an outpatient.  Patient was then discharged home in otherwise stable condition.     Constantino Zuluaga MD  07/25/24 7172

## 2024-07-19 NOTE — ED TRIAGE NOTES
Pt BIBA c/c of mechanical fall. Pt states that she slipped and fell on her R side and hit her head. Denies LOC - blood thinners. Pt states he r arm hurt at first but not anymore. Pt complaining of head pain    Pt A&Ox3, pt alert calm cooperative with care. Pt resp even and unlabored.     PMH: anemia

## 2024-07-20 ENCOUNTER — APPOINTMENT (OUTPATIENT)
Dept: RADIOLOGY | Facility: HOSPITAL | Age: 74
End: 2024-07-20
Payer: COMMERCIAL

## 2024-07-20 ENCOUNTER — HOSPITAL ENCOUNTER (EMERGENCY)
Facility: HOSPITAL | Age: 74
Discharge: OTHER INSTITUTION | End: 2024-07-21
Attending: EMERGENCY MEDICINE
Payer: COMMERCIAL

## 2024-07-20 DIAGNOSIS — R22.2 CHEST MASS: Primary | ICD-10-CM

## 2024-07-20 DIAGNOSIS — R09.02 HYPOXIA: ICD-10-CM

## 2024-07-20 LAB — LACTATE SERPL-SCNC: 1.7 MMOL/L (ref 0.4–2)

## 2024-07-20 PROCEDURE — 85025 COMPLETE CBC W/AUTO DIFF WBC: CPT | Performed by: EMERGENCY MEDICINE

## 2024-07-20 PROCEDURE — 93005 ELECTROCARDIOGRAM TRACING: CPT

## 2024-07-20 PROCEDURE — 71045 X-RAY EXAM CHEST 1 VIEW: CPT

## 2024-07-20 PROCEDURE — 71045 X-RAY EXAM CHEST 1 VIEW: CPT | Performed by: RADIOLOGY

## 2024-07-20 PROCEDURE — 80053 COMPREHEN METABOLIC PANEL: CPT | Performed by: EMERGENCY MEDICINE

## 2024-07-20 PROCEDURE — 83880 ASSAY OF NATRIURETIC PEPTIDE: CPT | Performed by: EMERGENCY MEDICINE

## 2024-07-20 PROCEDURE — 2500000004 HC RX 250 GENERAL PHARMACY W/ HCPCS (ALT 636 FOR OP/ED): Performed by: EMERGENCY MEDICINE

## 2024-07-20 PROCEDURE — 36415 COLL VENOUS BLD VENIPUNCTURE: CPT | Performed by: EMERGENCY MEDICINE

## 2024-07-20 PROCEDURE — 87636 SARSCOV2 & INF A&B AMP PRB: CPT | Performed by: EMERGENCY MEDICINE

## 2024-07-20 PROCEDURE — 96360 HYDRATION IV INFUSION INIT: CPT

## 2024-07-20 PROCEDURE — 99285 EMERGENCY DEPT VISIT HI MDM: CPT | Mod: 25

## 2024-07-20 PROCEDURE — 83605 ASSAY OF LACTIC ACID: CPT | Performed by: EMERGENCY MEDICINE

## 2024-07-20 RX ORDER — LIDOCAINE 560 MG/1
1 PATCH PERCUTANEOUS; TOPICAL; TRANSDERMAL DAILY
Status: DISCONTINUED | OUTPATIENT
Start: 2024-07-21 | End: 2024-07-21 | Stop reason: HOSPADM

## 2024-07-20 ASSESSMENT — LIFESTYLE VARIABLES
EVER FELT BAD OR GUILTY ABOUT YOUR DRINKING: NO
HAVE PEOPLE ANNOYED YOU BY CRITICIZING YOUR DRINKING: NO
HAVE YOU EVER FELT YOU SHOULD CUT DOWN ON YOUR DRINKING: NO
TOTAL SCORE: 0
EVER HAD A DRINK FIRST THING IN THE MORNING TO STEADY YOUR NERVES TO GET RID OF A HANGOVER: NO

## 2024-07-20 ASSESSMENT — PAIN SCALES - GENERAL: PAINLEVEL_OUTOF10: 0 - NO PAIN

## 2024-07-20 ASSESSMENT — PAIN - FUNCTIONAL ASSESSMENT: PAIN_FUNCTIONAL_ASSESSMENT: 0-10

## 2024-07-21 ENCOUNTER — APPOINTMENT (OUTPATIENT)
Dept: CARDIOLOGY | Facility: HOSPITAL | Age: 74
End: 2024-07-21
Payer: COMMERCIAL

## 2024-07-21 ENCOUNTER — HOSPITAL ENCOUNTER (INPATIENT)
Facility: HOSPITAL | Age: 74
End: 2024-07-21
Attending: INTERNAL MEDICINE | Admitting: INTERNAL MEDICINE
Payer: COMMERCIAL

## 2024-07-21 VITALS
HEART RATE: 102 BPM | OXYGEN SATURATION: 98 % | SYSTOLIC BLOOD PRESSURE: 136 MMHG | HEIGHT: 55 IN | TEMPERATURE: 99.1 F | WEIGHT: 74.52 LBS | BODY MASS INDEX: 17.24 KG/M2 | DIASTOLIC BLOOD PRESSURE: 97 MMHG | RESPIRATION RATE: 20 BRPM

## 2024-07-21 VITALS
HEART RATE: 106 BPM | DIASTOLIC BLOOD PRESSURE: 77 MMHG | OXYGEN SATURATION: 96 % | HEIGHT: 55 IN | RESPIRATION RATE: 18 BRPM | BODY MASS INDEX: 17.24 KG/M2 | SYSTOLIC BLOOD PRESSURE: 143 MMHG | TEMPERATURE: 97.5 F | WEIGHT: 74.52 LBS

## 2024-07-21 DIAGNOSIS — J18.9 PNEUMONIA DUE TO INFECTIOUS ORGANISM, UNSPECIFIED LATERALITY, UNSPECIFIED PART OF LUNG: ICD-10-CM

## 2024-07-21 DIAGNOSIS — R22.2 MASS IN CHEST: Primary | ICD-10-CM

## 2024-07-21 LAB
ALBUMIN SERPL BCP-MCNC: 2.8 G/DL (ref 3.4–5)
ALBUMIN SERPL BCP-MCNC: 3.1 G/DL (ref 3.4–5)
ALP SERPL-CCNC: 127 U/L (ref 33–136)
ALP SERPL-CCNC: 146 U/L (ref 33–136)
ALT SERPL W P-5'-P-CCNC: 80 U/L (ref 7–45)
ALT SERPL W P-5'-P-CCNC: 94 U/L (ref 7–45)
ANION GAP SERPL CALC-SCNC: 12 MMOL/L (ref 10–20)
ANION GAP SERPL CALC-SCNC: 12 MMOL/L (ref 10–20)
APPEARANCE UR: ABNORMAL
AST SERPL W P-5'-P-CCNC: 60 U/L (ref 9–39)
AST SERPL W P-5'-P-CCNC: 83 U/L (ref 9–39)
BASOPHILS # BLD AUTO: 0.01 X10*3/UL (ref 0–0.1)
BASOPHILS # BLD AUTO: 0.02 X10*3/UL (ref 0–0.1)
BASOPHILS NFR BLD AUTO: 0.1 %
BASOPHILS NFR BLD AUTO: 0.1 %
BILIRUB SERPL-MCNC: 0.9 MG/DL (ref 0–1.2)
BILIRUB SERPL-MCNC: 1.1 MG/DL (ref 0–1.2)
BILIRUB UR STRIP.AUTO-MCNC: NEGATIVE MG/DL
BNP SERPL-MCNC: 462 PG/ML (ref 0–99)
BUN SERPL-MCNC: 27 MG/DL (ref 6–23)
BUN SERPL-MCNC: 32 MG/DL (ref 6–23)
CALCIUM SERPL-MCNC: 8 MG/DL (ref 8.6–10.3)
CALCIUM SERPL-MCNC: 8.9 MG/DL (ref 8.6–10.3)
CHLORIDE SERPL-SCNC: 102 MMOL/L (ref 98–107)
CHLORIDE SERPL-SCNC: 103 MMOL/L (ref 98–107)
CO2 SERPL-SCNC: 22 MMOL/L (ref 21–32)
CO2 SERPL-SCNC: 24 MMOL/L (ref 21–32)
COLOR UR: YELLOW
CREAT SERPL-MCNC: 0.64 MG/DL (ref 0.5–1.05)
CREAT SERPL-MCNC: 0.84 MG/DL (ref 0.5–1.05)
EGFRCR SERPLBLD CKD-EPI 2021: 74 ML/MIN/1.73M*2
EGFRCR SERPLBLD CKD-EPI 2021: >90 ML/MIN/1.73M*2
EOSINOPHIL # BLD AUTO: 0 X10*3/UL (ref 0–0.4)
EOSINOPHIL # BLD AUTO: 0 X10*3/UL (ref 0–0.4)
EOSINOPHIL NFR BLD AUTO: 0 %
EOSINOPHIL NFR BLD AUTO: 0 %
ERYTHROCYTE [DISTWIDTH] IN BLOOD BY AUTOMATED COUNT: 14.4 % (ref 11.5–14.5)
ERYTHROCYTE [DISTWIDTH] IN BLOOD BY AUTOMATED COUNT: 14.5 % (ref 11.5–14.5)
FLUAV RNA RESP QL NAA+PROBE: NOT DETECTED
FLUBV RNA RESP QL NAA+PROBE: NOT DETECTED
GLUCOSE SERPL-MCNC: 166 MG/DL (ref 74–99)
GLUCOSE SERPL-MCNC: 85 MG/DL (ref 74–99)
GLUCOSE UR STRIP.AUTO-MCNC: NORMAL MG/DL
HCT VFR BLD AUTO: 20.9 % (ref 36–46)
HCT VFR BLD AUTO: 23.3 % (ref 36–46)
HGB BLD-MCNC: 7.1 G/DL (ref 12–16)
HGB BLD-MCNC: 7.9 G/DL (ref 12–16)
HYALINE CASTS #/AREA URNS AUTO: ABNORMAL /LPF
IMM GRANULOCYTES # BLD AUTO: 0.06 X10*3/UL (ref 0–0.5)
IMM GRANULOCYTES # BLD AUTO: 0.16 X10*3/UL (ref 0–0.5)
IMM GRANULOCYTES NFR BLD AUTO: 0.5 % (ref 0–0.9)
IMM GRANULOCYTES NFR BLD AUTO: 0.9 % (ref 0–0.9)
KETONES UR STRIP.AUTO-MCNC: ABNORMAL MG/DL
LEUKOCYTE ESTERASE UR QL STRIP.AUTO: ABNORMAL
LYMPHOCYTES # BLD AUTO: 0.14 X10*3/UL (ref 0.8–3)
LYMPHOCYTES # BLD AUTO: 0.48 X10*3/UL (ref 0.8–3)
LYMPHOCYTES NFR BLD AUTO: 0.8 %
LYMPHOCYTES NFR BLD AUTO: 4.2 %
MAGNESIUM SERPL-MCNC: 1.89 MG/DL (ref 1.6–2.4)
MCH RBC QN AUTO: 36.7 PG (ref 26–34)
MCH RBC QN AUTO: 37 PG (ref 26–34)
MCHC RBC AUTO-ENTMCNC: 33.9 G/DL (ref 32–36)
MCHC RBC AUTO-ENTMCNC: 34 G/DL (ref 32–36)
MCV RBC AUTO: 108 FL (ref 80–100)
MCV RBC AUTO: 109 FL (ref 80–100)
MONOCYTES # BLD AUTO: 1.12 X10*3/UL (ref 0.05–0.8)
MONOCYTES # BLD AUTO: 1.62 X10*3/UL (ref 0.05–0.8)
MONOCYTES NFR BLD AUTO: 9 %
MONOCYTES NFR BLD AUTO: 9.9 %
MUCOUS THREADS #/AREA URNS AUTO: ABNORMAL /LPF
NEUTROPHILS # BLD AUTO: 15.98 X10*3/UL (ref 1.6–5.5)
NEUTROPHILS # BLD AUTO: 9.69 X10*3/UL (ref 1.6–5.5)
NEUTROPHILS NFR BLD AUTO: 85.3 %
NEUTROPHILS NFR BLD AUTO: 89.2 %
NITRITE UR QL STRIP.AUTO: NEGATIVE
NRBC BLD-RTO: 0 /100 WBCS (ref 0–0)
NRBC BLD-RTO: 0 /100 WBCS (ref 0–0)
PH UR STRIP.AUTO: 6 [PH]
PHOSPHATE SERPL-MCNC: 3.1 MG/DL (ref 2.5–4.9)
PLATELET # BLD AUTO: 178 X10*3/UL (ref 150–450)
PLATELET # BLD AUTO: 186 X10*3/UL (ref 150–450)
POTASSIUM SERPL-SCNC: 3.9 MMOL/L (ref 3.5–5.3)
POTASSIUM SERPL-SCNC: 3.9 MMOL/L (ref 3.5–5.3)
PROT SERPL-MCNC: 5.2 G/DL (ref 6.4–8.2)
PROT SERPL-MCNC: 5.8 G/DL (ref 6.4–8.2)
PROT UR STRIP.AUTO-MCNC: ABNORMAL MG/DL
RBC # BLD AUTO: 1.92 X10*6/UL (ref 4–5.2)
RBC # BLD AUTO: 2.15 X10*6/UL (ref 4–5.2)
RBC # UR STRIP.AUTO: NEGATIVE /UL
RBC #/AREA URNS AUTO: ABNORMAL /HPF
SARS-COV-2 RNA RESP QL NAA+PROBE: NOT DETECTED
SODIUM SERPL-SCNC: 132 MMOL/L (ref 136–145)
SODIUM SERPL-SCNC: 135 MMOL/L (ref 136–145)
SP GR UR STRIP.AUTO: 1.04
SQUAMOUS #/AREA URNS AUTO: ABNORMAL /HPF
UROBILINOGEN UR STRIP.AUTO-MCNC: NORMAL MG/DL
WBC # BLD AUTO: 11.4 X10*3/UL (ref 4.4–11.3)
WBC # BLD AUTO: 17.9 X10*3/UL (ref 4.4–11.3)
WBC #/AREA URNS AUTO: >50 /HPF

## 2024-07-21 PROCEDURE — 36415 COLL VENOUS BLD VENIPUNCTURE: CPT | Performed by: INTERNAL MEDICINE

## 2024-07-21 PROCEDURE — 81001 URINALYSIS AUTO W/SCOPE: CPT | Performed by: INTERNAL MEDICINE

## 2024-07-21 PROCEDURE — 96365 THER/PROPH/DIAG IV INF INIT: CPT

## 2024-07-21 PROCEDURE — 2500000004 HC RX 250 GENERAL PHARMACY W/ HCPCS (ALT 636 FOR OP/ED): Performed by: INTERNAL MEDICINE

## 2024-07-21 PROCEDURE — 87449 NOS EACH ORGANISM AG IA: CPT | Mod: AHULAB | Performed by: INTERNAL MEDICINE

## 2024-07-21 PROCEDURE — 84100 ASSAY OF PHOSPHORUS: CPT | Performed by: INTERNAL MEDICINE

## 2024-07-21 PROCEDURE — 2500000002 HC RX 250 W HCPCS SELF ADMINISTERED DRUGS (ALT 637 FOR MEDICARE OP, ALT 636 FOR OP/ED): Performed by: INTERNAL MEDICINE

## 2024-07-21 PROCEDURE — 85025 COMPLETE CBC W/AUTO DIFF WBC: CPT | Performed by: INTERNAL MEDICINE

## 2024-07-21 PROCEDURE — 1100000001 HC PRIVATE ROOM DAILY

## 2024-07-21 PROCEDURE — 99223 1ST HOSP IP/OBS HIGH 75: CPT | Performed by: INTERNAL MEDICINE

## 2024-07-21 PROCEDURE — 87040 BLOOD CULTURE FOR BACTERIA: CPT | Mod: PORLAB | Performed by: INTERNAL MEDICINE

## 2024-07-21 PROCEDURE — 94640 AIRWAY INHALATION TREATMENT: CPT

## 2024-07-21 PROCEDURE — 84075 ASSAY ALKALINE PHOSPHATASE: CPT | Performed by: INTERNAL MEDICINE

## 2024-07-21 PROCEDURE — 2500000005 HC RX 250 GENERAL PHARMACY W/O HCPCS: Performed by: INTERNAL MEDICINE

## 2024-07-21 PROCEDURE — 96361 HYDRATE IV INFUSION ADD-ON: CPT

## 2024-07-21 PROCEDURE — 83735 ASSAY OF MAGNESIUM: CPT | Performed by: INTERNAL MEDICINE

## 2024-07-21 PROCEDURE — 96367 TX/PROPH/DG ADDL SEQ IV INF: CPT

## 2024-07-21 PROCEDURE — 94664 DEMO&/EVAL PT USE INHALER: CPT

## 2024-07-21 PROCEDURE — 87899 AGENT NOS ASSAY W/OPTIC: CPT | Mod: AHULAB | Performed by: INTERNAL MEDICINE

## 2024-07-21 RX ORDER — CEFTRIAXONE 2 G/50ML
2 INJECTION, SOLUTION INTRAVENOUS EVERY 24 HOURS
Status: DISCONTINUED | OUTPATIENT
Start: 2024-07-21 | End: 2024-07-21 | Stop reason: HOSPADM

## 2024-07-21 RX ORDER — ACETAMINOPHEN 650 MG/1
650 SUPPOSITORY RECTAL EVERY 4 HOURS PRN
Status: DISCONTINUED | OUTPATIENT
Start: 2024-07-21 | End: 2024-07-24 | Stop reason: HOSPADM

## 2024-07-21 RX ORDER — BUDESONIDE 0.5 MG/2ML
0.5 INHALANT ORAL
Status: DISCONTINUED | OUTPATIENT
Start: 2024-07-21 | End: 2024-07-24 | Stop reason: HOSPADM

## 2024-07-21 RX ORDER — ONDANSETRON HYDROCHLORIDE 2 MG/ML
4 INJECTION, SOLUTION INTRAVENOUS EVERY 8 HOURS PRN
Status: DISCONTINUED | OUTPATIENT
Start: 2024-07-21 | End: 2024-07-24 | Stop reason: HOSPADM

## 2024-07-21 RX ORDER — ACETAMINOPHEN 160 MG/5ML
650 SOLUTION ORAL EVERY 4 HOURS PRN
Status: DISCONTINUED | OUTPATIENT
Start: 2024-07-21 | End: 2024-07-24 | Stop reason: HOSPADM

## 2024-07-21 RX ORDER — LIDOCAINE 560 MG/1
1 PATCH PERCUTANEOUS; TOPICAL; TRANSDERMAL DAILY
Status: DISCONTINUED | OUTPATIENT
Start: 2024-07-22 | End: 2024-07-24 | Stop reason: HOSPADM

## 2024-07-21 RX ORDER — ACETAMINOPHEN 325 MG/1
650 TABLET ORAL EVERY 4 HOURS PRN
Status: DISCONTINUED | OUTPATIENT
Start: 2024-07-21 | End: 2024-07-24 | Stop reason: HOSPADM

## 2024-07-21 RX ORDER — ONDANSETRON 4 MG/1
4 TABLET, FILM COATED ORAL EVERY 8 HOURS PRN
Status: DISCONTINUED | OUTPATIENT
Start: 2024-07-21 | End: 2024-07-24 | Stop reason: HOSPADM

## 2024-07-21 RX ORDER — IPRATROPIUM BROMIDE AND ALBUTEROL SULFATE 2.5; .5 MG/3ML; MG/3ML
3 SOLUTION RESPIRATORY (INHALATION) EVERY 4 HOURS PRN
Status: DISCONTINUED | OUTPATIENT
Start: 2024-07-21 | End: 2024-07-21 | Stop reason: HOSPADM

## 2024-07-21 RX ORDER — ENOXAPARIN SODIUM 100 MG/ML
40 INJECTION SUBCUTANEOUS EVERY 24 HOURS
Status: DISCONTINUED | OUTPATIENT
Start: 2024-07-21 | End: 2024-07-24 | Stop reason: HOSPADM

## 2024-07-21 RX ORDER — IPRATROPIUM BROMIDE AND ALBUTEROL SULFATE 2.5; .5 MG/3ML; MG/3ML
3 SOLUTION RESPIRATORY (INHALATION) EVERY 2 HOUR PRN
Status: DISCONTINUED | OUTPATIENT
Start: 2024-07-21 | End: 2024-07-24 | Stop reason: HOSPADM

## 2024-07-21 RX ORDER — POLYETHYLENE GLYCOL 3350 17 G/17G
17 POWDER, FOR SOLUTION ORAL DAILY
Status: DISCONTINUED | OUTPATIENT
Start: 2024-07-21 | End: 2024-07-24 | Stop reason: HOSPADM

## 2024-07-21 RX ORDER — FORMOTEROL FUMARATE DIHYDRATE 20 UG/2ML
20 SOLUTION RESPIRATORY (INHALATION)
Status: DISCONTINUED | OUTPATIENT
Start: 2024-07-21 | End: 2024-07-24 | Stop reason: HOSPADM

## 2024-07-21 RX ORDER — IPRATROPIUM BROMIDE AND ALBUTEROL SULFATE 2.5; .5 MG/3ML; MG/3ML
3 SOLUTION RESPIRATORY (INHALATION) EVERY 4 HOURS PRN
Status: DISCONTINUED | OUTPATIENT
Start: 2024-07-21 | End: 2024-07-21

## 2024-07-21 RX ADMIN — ENOXAPARIN SODIUM 40 MG: 40 INJECTION SUBCUTANEOUS at 11:39

## 2024-07-21 RX ADMIN — POLYETHYLENE GLYCOL 3350 17 G: 17 POWDER, FOR SOLUTION ORAL at 11:39

## 2024-07-21 RX ADMIN — PREDNISONE 15 MG: 5 TABLET ORAL at 13:20

## 2024-07-21 RX ADMIN — Medication 2 L/MIN: at 20:37

## 2024-07-21 RX ADMIN — BUDESONIDE 0.5 MG: 0.5 INHALANT RESPIRATORY (INHALATION) at 20:29

## 2024-07-21 RX ADMIN — FORMOTEROL FUMARATE DIHYDRATE 20 MCG: 20 SOLUTION RESPIRATORY (INHALATION) at 20:29

## 2024-07-21 SDOH — SOCIAL STABILITY: SOCIAL INSECURITY: DOES ANYONE TRY TO KEEP YOU FROM HAVING/CONTACTING OTHER FRIENDS OR DOING THINGS OUTSIDE YOUR HOME?: NO

## 2024-07-21 SDOH — SOCIAL STABILITY: SOCIAL INSECURITY: HAVE YOU HAD ANY THOUGHTS OF HARMING ANYONE ELSE?: NO

## 2024-07-21 SDOH — SOCIAL STABILITY: SOCIAL INSECURITY: ABUSE: ADULT

## 2024-07-21 SDOH — SOCIAL STABILITY: SOCIAL INSECURITY: HAS ANYONE EVER THREATENED TO HURT YOUR FAMILY OR YOUR PETS?: NO

## 2024-07-21 SDOH — SOCIAL STABILITY: SOCIAL INSECURITY: DO YOU FEEL ANYONE HAS EXPLOITED OR TAKEN ADVANTAGE OF YOU FINANCIALLY OR OF YOUR PERSONAL PROPERTY?: NO

## 2024-07-21 SDOH — SOCIAL STABILITY: SOCIAL INSECURITY: HAVE YOU HAD THOUGHTS OF HARMING ANYONE ELSE?: NO

## 2024-07-21 SDOH — SOCIAL STABILITY: SOCIAL INSECURITY: ARE YOU OR HAVE YOU BEEN THREATENED OR ABUSED PHYSICALLY, EMOTIONALLY, OR SEXUALLY BY ANYONE?: NO

## 2024-07-21 SDOH — SOCIAL STABILITY: SOCIAL INSECURITY: DO YOU FEEL UNSAFE GOING BACK TO THE PLACE WHERE YOU ARE LIVING?: NO

## 2024-07-21 SDOH — SOCIAL STABILITY: SOCIAL INSECURITY: WERE YOU ABLE TO COMPLETE ALL THE BEHAVIORAL HEALTH SCREENINGS?: YES

## 2024-07-21 SDOH — SOCIAL STABILITY: SOCIAL INSECURITY: ARE THERE ANY APPARENT SIGNS OF INJURIES/BEHAVIORS THAT COULD BE RELATED TO ABUSE/NEGLECT?: NO

## 2024-07-21 ASSESSMENT — COLUMBIA-SUICIDE SEVERITY RATING SCALE - C-SSRS
1. IN THE PAST MONTH, HAVE YOU WISHED YOU WERE DEAD OR WISHED YOU COULD GO TO SLEEP AND NOT WAKE UP?: NO
6. HAVE YOU EVER DONE ANYTHING, STARTED TO DO ANYTHING, OR PREPARED TO DO ANYTHING TO END YOUR LIFE?: NO
4. HAVE YOU HAD THESE THOUGHTS AND HAD SOME INTENTION OF ACTING ON THEM?: NO
2. HAVE YOU ACTUALLY HAD ANY THOUGHTS OF KILLING YOURSELF?: NO
5. HAVE YOU STARTED TO WORK OUT OR WORKED OUT THE DETAILS OF HOW TO KILL YOURSELF? DO YOU INTEND TO CARRY OUT THIS PLAN?: NO

## 2024-07-21 ASSESSMENT — COGNITIVE AND FUNCTIONAL STATUS - GENERAL
TOILETING: A LITTLE
MOVING TO AND FROM BED TO CHAIR: A LOT
STANDING UP FROM CHAIR USING ARMS: A LITTLE
TOILETING: A LITTLE
PATIENT BASELINE BEDBOUND: NO
MOVING TO AND FROM BED TO CHAIR: A LOT
MOBILITY SCORE: 14
TURNING FROM BACK TO SIDE WHILE IN FLAT BAD: A LOT
HELP NEEDED FOR BATHING: A LITTLE
EATING MEALS: A LITTLE
STANDING UP FROM CHAIR USING ARMS: A LITTLE
DRESSING REGULAR UPPER BODY CLOTHING: A LITTLE
CLIMB 3 TO 5 STEPS WITH RAILING: A LOT
DRESSING REGULAR LOWER BODY CLOTHING: A LITTLE
CLIMB 3 TO 5 STEPS WITH RAILING: A LOT
MOVING FROM LYING ON BACK TO SITTING ON SIDE OF FLAT BED WITH BEDRAILS: A LOT
DAILY ACTIVITIY SCORE: 18
HELP NEEDED FOR BATHING: A LITTLE
MOVING FROM LYING ON BACK TO SITTING ON SIDE OF FLAT BED WITH BEDRAILS: A LOT
PERSONAL GROOMING: A LITTLE
MOBILITY SCORE: 14
WALKING IN HOSPITAL ROOM: A LITTLE
TURNING FROM BACK TO SIDE WHILE IN FLAT BAD: A LOT
WALKING IN HOSPITAL ROOM: A LITTLE
DRESSING REGULAR LOWER BODY CLOTHING: A LITTLE
DRESSING REGULAR UPPER BODY CLOTHING: A LITTLE
DAILY ACTIVITIY SCORE: 20

## 2024-07-21 ASSESSMENT — LIFESTYLE VARIABLES
SKIP TO QUESTIONS 9-10: 1
AUDIT-C TOTAL SCORE: 0
AUDIT-C TOTAL SCORE: 0
HOW OFTEN DO YOU HAVE 6 OR MORE DRINKS ON ONE OCCASION: NEVER
PRESCIPTION_ABUSE_PAST_12_MONTHS: NO
SUBSTANCE_ABUSE_PAST_12_MONTHS: NO
AUDIT-C TOTAL SCORE: 0
SKIP TO QUESTIONS 9-10: 1
HOW OFTEN DO YOU HAVE 6 OR MORE DRINKS ON ONE OCCASION: NEVER
HOW MANY STANDARD DRINKS CONTAINING ALCOHOL DO YOU HAVE ON A TYPICAL DAY: PATIENT DOES NOT DRINK
HOW OFTEN DO YOU HAVE A DRINK CONTAINING ALCOHOL: NEVER
AUDIT-C TOTAL SCORE: 0
HOW MANY STANDARD DRINKS CONTAINING ALCOHOL DO YOU HAVE ON A TYPICAL DAY: PATIENT DOES NOT DRINK
HOW OFTEN DO YOU HAVE A DRINK CONTAINING ALCOHOL: NEVER

## 2024-07-21 ASSESSMENT — PAIN - FUNCTIONAL ASSESSMENT: PAIN_FUNCTIONAL_ASSESSMENT: 0-10

## 2024-07-21 ASSESSMENT — PAIN SCALES - GENERAL
PAINLEVEL_OUTOF10: 0 - NO PAIN
PAINLEVEL_OUTOF10: 0 - NO PAIN

## 2024-07-21 ASSESSMENT — ACTIVITIES OF DAILY LIVING (ADL)
HEARING - RIGHT EAR: HEARING AID
GROOMING: NEEDS ASSISTANCE
BATHING: NEEDS ASSISTANCE
WALKS IN HOME: NEEDS ASSISTANCE
TOILETING: NEEDS ASSISTANCE
DRESSING YOURSELF: NEEDS ASSISTANCE
FEEDING YOURSELF: NEEDS ASSISTANCE
LACK_OF_TRANSPORTATION: NO
JUDGMENT_ADEQUATE_SAFELY_COMPLETE_DAILY_ACTIVITIES: YES
PATIENT'S MEMORY ADEQUATE TO SAFELY COMPLETE DAILY ACTIVITIES?: YES
ADEQUATE_TO_COMPLETE_ADL: YES
HEARING - LEFT EAR: HEARING AID

## 2024-07-21 ASSESSMENT — PATIENT HEALTH QUESTIONNAIRE - PHQ9
1. LITTLE INTEREST OR PLEASURE IN DOING THINGS: NOT AT ALL
SUM OF ALL RESPONSES TO PHQ9 QUESTIONS 1 & 2: 0
SUM OF ALL RESPONSES TO PHQ9 QUESTIONS 1 & 2: 0
2. FEELING DOWN, DEPRESSED OR HOPELESS: NOT AT ALL
1. LITTLE INTEREST OR PLEASURE IN DOING THINGS: NOT AT ALL
2. FEELING DOWN, DEPRESSED OR HOPELESS: NOT AT ALL

## 2024-07-21 NOTE — ED PROVIDER NOTES
Kim Gillespie is a 74 y.o. patient presenting to the ED for fall.  The patient states that she was getting out of bed when she slipped and fell landing on her right side.  She did hit her head.  She denies losing consciousness.  No blood thinners.  She has mild pain in her head but denies any other pain or injuries.    Additional History Obtained from: EMS  Limitations to History: None  ------------------------------------------------------------------------------------------------------------------------------------------  Physical Exam:  Appearance: Alert, cooperative,   Skin: Warm, dry, scalp hematoma with superficial abrasion.  Eyes: Cornea clear. No scleral icterus or injection.   ENT: Mucous membranes moist.  Pulmonary: No accessory muscle use or stridor. Clear lung sounds bilaterally without rhonchi or wheezing.   Cardiac: Heart sounds regular without murmur. B/L radial pulses full and symmetric.   Abdomen: Soft, not tender.  No rebound or guarding.   Musculoskeletal: No gross deformities.  No significant tenderness to palpation of the shoulders, arms, hips, legs.    Neurological: Face symmetrical. Voice clear. Appropriately conversant.  Motor and sensation grossly intact.  Psychiatric: Appropriate mood and affect.    Medical Decision Making:  Chronic Medical Conditions Significantly Affecting Care:  has no past medical history on file.    Social Determinants of Health Significantly Affecting Care: None identified    Differential Diagnosis Considered but not limited to: Patient with occipital hematoma after fall out of bed.  She denies any complaints in the emergency department however given this will obtain CT head and C-spine to rule out occult injury.      External Records Reviewed:   I reviewed recent and relevant outside records including:     Independent Interpretation of Studies: The following studies were ordered as part of the emergency department work up and independently interpreted by me. See ED  Course for details.    CT of the head by my interpretation does not show any evidence of intracranial hemorrhage.  This was confirmed by radiology.  CT of the C-spine however does show C6 and C7 spinous process fractures.  No other fractures identified.  Chest x-ray does show what appears to be a very large pleural effusion.  As such CT pan scan was added to her workup.    Diagnoses as of 07/22/24 0134   Fall, initial encounter   Closed fracture of first lumbar vertebra, unspecified fracture morphology, initial encounter (Multi)   Closed fracture of spinous process of cervical vertebra, initial encounter (Multi)     The patient was signed out to the oncoming physician pending CT pan scan results.     Magy Gilliland,   07/22/24 0134

## 2024-07-21 NOTE — CARE PLAN
Problem: Pain - Adult  Goal: Verbalizes/displays adequate comfort level or baseline comfort level  Outcome: Progressing     Problem: Safety - Adult  Goal: Free from fall injury  Outcome: Progressing     Problem: Discharge Planning  Goal: Discharge to home or other facility with appropriate resources  Outcome: Progressing     Problem: Chronic Conditions and Co-morbidities  Goal: Patient's chronic conditions and co-morbidity symptoms are monitored and maintained or improved  Outcome: Progressing   The patient's goals for the shift include      The clinical goals for the shift include maintain safety throughout shift

## 2024-07-21 NOTE — SIGNIFICANT EVENT
Was called for potential admission of the patient.  It should be noted that she had presented on 07/19/2024 after a mechanical fall at home and had sustained an L1 transverse fracture, compression fractures of L1 and L3 but she was able to ambulate and was appropriately interactive.  The nursing home staff had stated that she was confused and had her return to the ED for further evaluation and management.  Patient was evaluated again in the ED due to concerns for confusion from the nursing staff as well as shortness of breath.  Upon extensive evaluation in the ED she was noted to have increasing hyponatremia from 135-132, stable renal function at BUN/creatinine of 21/0.51 and a repeat on representation at 32/0.84.  She was noted to have increasing LFTs after alkaline phosphatase/ALT/AST/T. bili at which time was noted at 172/36/23/0 0.8 to 146/94/83/1 0.1 respectively.  It was also noted that she had a BNP of 462.  It was noted that she had a rising leukocytosis of 13-17.9 and a hemoglobin that had slightly decreased from 8.4-7.9.  Unfortunately we do not know her baseline hemoglobin or renal function or LFTs.  An XR of the chest again showed the right pulmonary mass, pleural effusion and airspace opacities similar to prior imaging.  Imaging was reviewed from 07/19/2024 and noted in the EMR.  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  And otherwise no acute intra-abdominal processes but there was mention of cholelithiasis, horseshoe kidney, colonic diverticulosis and diffuse osteopenia with lucent lesions at the bilateral iliac bones, osseous metastasis could not be excluded and a  1.4 cm sclerotic lesion at the right iliac bone indeterminate.  Spinal imaging as well noted acute mildly displaced fracture of the right transverse process at L1, compression fracture at the inferior endplate of L1 and compression fracture at the superior endplate of L3 with multilevel degenerative changes as well as remote appearing compression deformity at T12.  I was asked to further assist with possible admission for this patient and/or transfer.  It should be noted that at [facility] we do not have 24/7 pulmonary care and we do not have interventional radiology available throughout the week next week.  In order to help facilitate evaluation and management it was elected that the patient be transferred.  I was asked to assist with transfer due to several critical patients in the emergency department.  5.  I did reach out through the transfer center for the medicine team and was transferred to Dr Montgomery at [facility].  The patient's history to the best of my knowledge and available in the EMR as well as her presentation on 07/19/2024 as well as on 07/20/2024 was reviewed extensively with the physician.  It was also made aware that we have limited capacity with regards to no availability of pulmonary service 24/7 and we do not have interventional radiology available next week.  It was explained that the patient did appear overall stable except for a slight tachycardia and hypoxia requiring 2L via NC for a total of 100%.  The patient was excepted to the general medical floor with telemetry monitoring and is currently waiting for a bed at [facility].  It will be communicated to the ED team that it is recommended that blood cultures times to be obtained, repeat morning labs with a CBC with differential/renal function panel/magnesium be obtained as well as blood cultures and initiated on IV antibiotics due to concerns for underlying infectious process which may be obscured by this large mass especially in the setting  of her rising white blood cell count.  We will also go ahead and obtain respiratory studies as well with a Legionella/Streptococcus pneumoniae urinary antigens as well as sputum culture.  If these are not obtained prior to the patient leaving then would recommend that be they be completed accordingly at the receiving facility.  Will initiate on IV Rocephin/azithromycin.  Will also place on as needed nebulizer treatments.  AOx4, no noted skin lesions noted on exposed area, PERRLA, EOMI, moist mucous membranes, normocephalic/atraumatic, neck was supple with midline trachea, no noted neck step-offs or rigidity or loss of range of motion, diminished breath sounds throughout all lung fields with patchy rhonchi particularly in the lower lung fields on the left and obscured on the right likely in setting of large mass and there are some faint crackles versus rhonchi in the upper lobes on the right.  Regular rate and rhythm without any known murmurs, no noted JVD, abdomen was soft and nontender with no noted masses/organomegaly appreciated, bowel sounds present, range of motion/strength appears globally diminished likely around a 4-5 out of 5, patient was appropriately interactive with intact cranial nerves without any grossly evident motor/sensory deficits on examination.  Fall, lumbar fracture, thoracic fracture, concerns for community-acquired pneumonia, anemia (unknown baseline), large mediastinal mass, hyponatremia, transaminitis, hypoxic respiratory failure acute  It should be noted that there is a documentation from 2019 regarding the patient's having an evaluation for lower lung field nodules and a mass but unfortunately we cannot see any of the imaging and it does not seem like she had followed up since.  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  There was no mention of follow-up other than CT imaging.  This was verbalized to the [facility] team.  Patient is currently awaiting transfer to [facility].

## 2024-07-21 NOTE — H&P
History Of Present Illness  Kim Gillespie is a 74 y.o. female presenting with right hemithorax mass.  Is a very pleasant 74-year-old female with past medical history of asthma and hemolytic anemia on chronic prednisone therapy 50 mg oral every 48 hours.  Patient states she had a follow-up to going to another  facility's ER.  States it was mechanical no chest pain no dizziness.  Patient on imaging was found to have a right hemithorax mass therefore patient was transferred to [facility].  Patient denies any dyspnea nausea vomiting.  Denies back pain as she was also found to have a L1 fracture     Past Medical History  She has no past medical history on file.    Surgical History  She has no past surgical history on file.     Social History  She has no history on file for tobacco use, alcohol use, and drug use.    Family History  No family history on file.     Allergies  Patient has no known allergies.    Review of Systems   All other systems reviewed and are negative.       Physical Exam  HENT:      Head: Normocephalic.      Nose: Nose normal.   Cardiovascular:      Rate and Rhythm: Normal rate and regular rhythm.   Pulmonary:      Effort: Pulmonary effort is normal.   Abdominal:      General: Abdomen is flat. Bowel sounds are normal.      Palpations: Abdomen is soft.   Skin:     General: Skin is warm.      Capillary Refill: Capillary refill takes less than 2 seconds.   Neurological:      General: No focal deficit present.      Mental Status: She is alert.          Last Recorded Vitals  /70   Pulse 105   Temp 36.4 °C (97.5 °F) (Temporal)   Resp 18   Wt (!) 33.8 kg (74 lb 8.3 oz)   SpO2 93%     Relevant Results  Results for orders placed or performed during the hospital encounter of 07/21/24 (from the past 24 hour(s))   Urinalysis with Reflex Microscopic   Result Value Ref Range    Color, Urine Yellow Light-Yellow, Yellow, Dark-Yellow    Appearance, Urine Turbid (N) Clear    Specific Gravity, Urine 1.037 (N) 1.005  - 1.035    pH, Urine 6.0 5.0, 5.5, 6.0, 6.5, 7.0, 7.5, 8.0    Protein, Urine 30 (1+) (A) NEGATIVE, 10 (TRACE), 20 (TRACE) mg/dL    Glucose, Urine Normal Normal mg/dL    Blood, Urine NEGATIVE NEGATIVE    Ketones, Urine 10 (1+) (A) NEGATIVE mg/dL    Bilirubin, Urine NEGATIVE NEGATIVE    Urobilinogen, Urine Normal Normal mg/dL    Nitrite, Urine NEGATIVE NEGATIVE    Leukocyte Esterase, Urine 500 Margot/µL (A) NEGATIVE   Microscopic Only, Urine   Result Value Ref Range    WBC, Urine >50 (A) 1-5, NONE /HPF    RBC, Urine 6-10 (A) NONE, 1-2, 3-5 /HPF    Squamous Epithelial Cells, Urine 1-9 (SPARSE) Reference range not established. /HPF    Mucus, Urine 2+ Reference range not established. /LPF    Hyaline Casts, Urine 1+ (A) NONE /LPF     [START ON 7/22/2024] azithromycin, 500 mg, intravenous, q24h  [START ON 7/22/2024] cefTRIAXone, 2 g, intravenous, q24h  enoxaparin, 40 mg, subcutaneous, q24h  [START ON 7/22/2024] lidocaine, 1 patch, transdermal, Daily  polyethylene glycol, 17 g, oral, Daily  predniSONE, 15 mg, oral, q48h      Imaging:  Ct chest/abd/pelvis/thoracic and lumbar spine:  IMPRESSION:  CHEST/THORACIC SPINE :  1. Study degraded by motion artifact. No acute intrathoracic  posttraumatic findings.  2. 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  3. Faint nodular ground-glass opacities at the bilateral upper lobes,  may be secondary to acute infection/inflammation.  4. Small right pleural effusion. Atelectasis at the right middle lobe  and right lower lobe.  5. Mild atelectasis at the left lower lobe.      ABDOMEN - PELVIS/LUMBAR SPINE:  1. Study degraded by motion artifact. No acute intra-abdominal  posttraumatic findings.  2. Cholelithiasis.  3. Horseshoe kidney.  4. Colonic diverticulosis.  5. Diffuse osteopenia. Lucent lesions at the bilateral iliac bones,  osseous metastases  cannot be excluded. Bone scan can be obtained for  further evaluation.  6. 1.4 cm sclerotic lesion at the right iliac bone, indeterminate.          THORACIC AND LUMBAR SPINE:  1. Acute, mildly displaced, fracture at the right transverse process  of L1.  2. Compression fracture at the inferior endplate of L1 and  compression fracture at the superior endplate of L3, of indeterminate  age. Please correlate with point tenderness. If there is clinical  concern for acute/subacute fracture, MRI can be obtained for further  evaluation.  3. Multilevel degenerative changes at the lumbar spine.  4. No acute fracture of the thoracic spine. Degenerative changes.  Remote appearing compression deformity at T12 with anterior wedging.        Assessment/Plan   Principal Problem:    Mass in chest  Right hemithorax mass  -Consulted CT surgery further recommendations concerning for liposarcoma    2.  Bilateral upper lobe pneumonia  -Continue ceftriaxone azithromycin    3.  Mildly displaced L1 right transverse process fracture with compression fracture at the inferior endplate of L1 and superior endplate of L3 of indeterminate age  -Pain control, PT OT    4.  Hemolytic anemia  -On prednisone therapy 15 mg oral every 48 hours    5.  Asthma  -Current inhalers    Dnr ok with intubation       Ya Funez MD

## 2024-07-21 NOTE — CONSULTS
Reason For Consult  Right sided hemithorax mass seen on CT Scan    History Of Present Illness  Kim Gillespie is a 74 y.o. female presenting with history of GUNNAR, GERD, hypoplastic anemia, Danielson esophagus, melanoma of left arm, frequent falls, past CT guided biopsy positive for myelolipoma as well as right lung wedge resection in 2018, no malignant cells at that time.  Patient was at her nursing home suffering a fall while getting out of bed slipping, landing on her right side, patient verbalizes she also hit her head but did not lose consciousness as well as mild hypoxia.   CT head negative for hemorrhage, acute nondisplaced fracture of C6 spinous process, mildly displaced fracture at spinous process of C7 along with soft tissue contusion at the right paraspinal soft tissue at C6-C7 level also CT findings showed mildly displaced fracture at right transverse process of L1, compression fracture at the superior endplate of L3, No acute fracture of the thoracic spine. Degenerative changes. Remote appearing compression deformity at T12 with anterior wedging.  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 Cholelithiasis, colonic diverticulosis, diffuse osteopenia, and lucent lesions Lucent at the bilateral iliac bones, osseous metastases cannot be excluded.  Thoracic surgery is being consulted for evaluation of fat containing mass see at right posterior right hemithorax.      Upon arrival at bedside patient is resting comfortably in bed on 2L NC and in no acute distress.  She is alert and oriented x3 and to situation, denies any dizziness, chest pain, palpitations, abdominal pain, n/v, NASH equally, denies any bleeding.  She does verbalize some mild sob but upon conversation does not exhibit any conversational dyspnea.  She also verbalizes frequent  falls due to unsteady gait.  Otherwise she offers no complaints.  .     Past Medical History  GUNNAR, GERD, hypoplastic anemia, Danielson esophagus, melanoma of left arm, frequent falls, past CT guided biopsy positive for myelolipoma as well as right lung wedge resection in 2018, no malignant cells at that time.     Surgical History  CT guided biopsy positive for myelolipoma as well as right lung wedge resection in 2018     Social History  She has no history on file for tobacco use, alcohol use, and drug use.    Family History    Cancer Mother            Pancreatic Cancer    Prostate Cancer Father      Cancer Maternal Grandmother           Pancreatic Cancer    other (Other) Paternal Grandmother           Cardiac        Allergies  Patient has no known allergies.    Review of Systems  See HPI     Physical Exam  Physical Exam  Constitutional:       General: She is not in acute distress.     Appearance: Normal appearance.   Cardiovascular:      Rate and Rhythm: Normal rate and regular rhythm.      Pulses: Normal pulses.   Pulmonary:      Breath sounds: Examination of the right-upper field reveals decreased breath sounds. Examination of the right-middle field reveals decreased breath sounds. Examination of the right-lower field reveals decreased breath sounds. Decreased breath sounds present.   Abdominal:      General: There is no distension.      Palpations: Abdomen is soft.      Tenderness: There is no abdominal tenderness.   Musculoskeletal:         General: Normal range of motion.   Skin:     General: Skin is cool and dry.      Capillary Refill: Capillary refill takes less than 2 seconds.      Coloration: Skin is pale.   Neurological:      General: No focal deficit present.      Mental Status: She is alert and oriented to person, place, and time. Mental status is at baseline.   Psychiatric:         Mood and Affect: Mood normal.            Last Recorded Vitals  Blood pressure 127/70, pulse 105, temperature 36.4 °C (97.5  "°F), temperature source Temporal, resp. rate 18, height 1.219 m (3' 11.99\"), weight (!) 33.8 kg (74 lb 8.3 oz), SpO2 93%.    Relevant Results  Results for orders placed or performed during the hospital encounter of 07/21/24 (from the past 24 hour(s))   Urinalysis with Reflex Microscopic   Result Value Ref Range    Color, Urine Yellow Light-Yellow, Yellow, Dark-Yellow    Appearance, Urine Turbid (N) Clear    Specific Gravity, Urine 1.037 (N) 1.005 - 1.035    pH, Urine 6.0 5.0, 5.5, 6.0, 6.5, 7.0, 7.5, 8.0    Protein, Urine 30 (1+) (A) NEGATIVE, 10 (TRACE), 20 (TRACE) mg/dL    Glucose, Urine Normal Normal mg/dL    Blood, Urine NEGATIVE NEGATIVE    Ketones, Urine 10 (1+) (A) NEGATIVE mg/dL    Bilirubin, Urine NEGATIVE NEGATIVE    Urobilinogen, Urine Normal Normal mg/dL    Nitrite, Urine NEGATIVE NEGATIVE    Leukocyte Esterase, Urine 500 Margot/µL (A) NEGATIVE   Microscopic Only, Urine   Result Value Ref Range    WBC, Urine >50 (A) 1-5, NONE /HPF    RBC, Urine 6-10 (A) NONE, 1-2, 3-5 /HPF    Squamous Epithelial Cells, Urine 1-9 (SPARSE) Reference range not established. /HPF    Mucus, Urine 2+ Reference range not established. /LPF    Hyaline Casts, Urine 1+ (A) NONE /LPF     XR chest 1 view    Result Date: 7/21/2024  Interpreted By:  Nargis Saravia, STUDY: XR CHEST 1 VIEW;  7/20/2024 11:39 pm   INDICATION: Signs/Symptoms:tachycardic.   COMPARISON: 07/19/2024   ACCESSION NUMBER(S): [id number]   ORDERING CLINICIAN: ORQUIDEA CLAYTON   FINDINGS: AP radiograph of the chest was provided.       CARDIOMEDIASTINAL SILHOUETTE: Partially obscured otherwise similar to prior imaging.   LUNGS: Read demonstrated right pleural effusion. Underlying right chest mass better delineated on same-day chest CT dot adjacent right airspace opacities likely atelectasis however superimposed aspiration or infection are excluded. The left lung field is mildly hyperinflated otherwise grossly unremarkable.   ABDOMEN: No remarkable upper abdominal findings. "   BONES: Degenerative changes of the visualized spine. Possible remote traumatic changes of the right proximal humerus.       1.  Right pulmonary mass, pleural effusion and airspace opacity similar to the prior imaging.       MACRO: None   Signed by: Nargis Saravia 7/21/2024 12:38 AM Dictation workstation:   [id number]        Assessment/Plan   Kim Gillespie is a 74 y.o. female presenting with history of GUNNAR, GERD, hypoplastic anemia, Ibrahim syndrome, Danielson esophagus, melanoma of left arm, frequent falls, past CT guided biopsy positive for myelolipoma as well as right lung wedge resection in 2018, no malignant cells at that time.  Patient was at her nursing home suffering a fall while getting out of bed slipping, landing on her right side, patient verbalizes she also hit her head but did not lose consciousness as well as mild hypoxia.   CT head negative for hemorrhage, acute nondisplaced fracture of C6 spinous process, mildly displaced fracture at spinous process of C7 along with soft tissue contusion at the right paraspinal soft tissue at C6-C7 level also CT findings showed mildly displaced fracture at right transverse process of L1, compression fracture at the superior endplate of L3, No acute fracture of the thoracic spine. Degenerative changes. Remote appearing compression deformity at T12 with anterior wedging.  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 Cholelithiasis, colonic diverticulosis, diffuse osteopenia, and lucent lesions Lucent at the bilateral iliac bones, osseous metastases cannot be excluded.  Thoracic surgery is being consulted for evaluation of fat containing mass see at right posterior right hemithorax.      Continue abx regimen per primary for concern for pneumonia    Tylenol for acute pain    Likely no immediate  surgical intervention if mass is truly fat containing.  Typically, these are never cancerous.      Discussed with Dr. Lerner      I spent 60 minutes in the professional and overall care of this patient.      FIDELIA Garces-CNP

## 2024-07-21 NOTE — ED PROVIDER NOTES
HPI   Chief Complaint   Patient presents with    Weakness, Gen       HPI    HISTORY OF PRESENT ILLNESS:  Patient is a 74-year-old female presents the emergency department for concern of altered mentation.  The patient had a mechanical fall yesterday.  Had fallen onto her right side without loss of consciousness or blood thinners.  Patient at that time had imaging that found that she had a L1 transverse fracture, compression fracture of L1 and L3.  Patient has been able to ambulate.  There was concern by the nursing home staff that she was confused.  Patient currently is alert and oriented x 4.  States that she was having back pain but currently denies other acute complaints at this time such as fever, chills, cough, nausea and vomiting.  She has been able to walk with the assistance of nursing staff    Past Medical History: GUNNAR, GERD, asthma, anemia, osteoporosis, Ibrahim syndrome, melanoma  Past Surgical History: Hysterectomy and lung wedge resection  Family History: family history not pertinent to presenting problem or chief complaint  Social History: Patient is from the SNF    __________________________________________________________  PHYSICAL EXAM:    Appearance: Appears stated age  female, elderly, chronically ill-appearing, alert, oriented x4, cooperative   Skin: Intact,  dry skin, no lesions, rash, petechiae or purpura.   Eyes: PERRLA, EOMs intact,  Conjunctiva pink with no redness or exudates.    HENT: Normocephalic, atraumatic. Nares patent   Neck: Supple. Trachea at midline.   Pulmonary: Diminished breath sounds bilaterally, right worse than left  Cardiac: Regular rate and rhythm, no rubs, murmurs, or gallops. No JVD,   Abdomen: Abdomen is soft, nontender, and nondistended.  No palpable organomegaly.  No rebound or guarding.  No CVA tenderness. Nonsurgical abdomen  Genitourinary: Exam deferred.  Musculoskeletal: no edema, pain, cyanosis, or deformity in extremities. Pulses full and equal.    Neurological:  Cranial nerves are grossly intact, grossly normal sensation, no weakness, no focal findings identified.    __________________________________________________________  MEDICAL DECISION MAKING:    Patient was seen and examined.  Patient was sent here for altered mentation.  Upon arrival, patient was hypoxic initially.  This was new requirement for her.  Patient currently was alert and oriented x 4.  Altered mentation could be related to a occult infection, transient from the hypoxia.  Note, patient recently had been seen in the emergency department. Of note, the patient imaging had also noted that she had a 9 x 9 x 10 cm fat-containing mass in the right hemothorax concerning for liposarcoma.  The patient also had lumbar fractures.  Did not appear to have acute lower extremity weakness.  I did consider CT imaging but Patient just had underwent CT chest abdomen pelvis.  Will obtain labs and chest x-ray.    Patient laboratory results showed a mild anemia of 7.1 which is a slow downtrend.  Patient care signed out pending labs and chest x-ray result and admission for the patient's hypoxia.    Chronic Medical Conditions Significantly Affecting Care: GUNNAR, GERD, asthma, Ibrahim syndrome, melanoma, prior lung resection    External Records Reviewed: I reviewed recent and relevant outside records including: Prior CT imaging noted any    Gee Tello  Emergency Medicine    Patient History   No past medical history on file.  No past surgical history on file.  No family history on file.  Social History     Tobacco Use    Smoking status: Not on file    Smokeless tobacco: Not on file   Substance Use Topics    Alcohol use: Not on file    Drug use: Not on file       Physical Exam   ED Triage Vitals [07/20/24 2250]   Temperature Heart Rate Respirations BP   37.3 °C (99.1 °F) (!) 118 20 136/69      Pulse Ox Temp src Heart Rate Source Patient Position   (!) 88 % -- -- --      BP Location FiO2 (%)     -- 28 %       Physical  "Exam      ED Course & MDM   ED Course as of 07/22/24 2118   Sat Jul 20, 2024   2330 Heart Rate(!): 118 [WJ]   2332 Just discovered that the patient has a new oxygen requirement. [WJ]   2333 Heart Rate(!): 118  Patient heart rate 2 days ago was 105 [WJ]   2355 Lactate: 1.7 [WJ]   Sun Jul 21, 2024   0010 Patient twelve-lead EKG inter by myself shows sinus tachycardia, ventricular 103, normal TN interval, normal QRS duration, normal QT, no STEMI. [WJ]   0030 Patient's progress care note from 2019 was reviewed.  This is copied and pasted from the note.  Unfortunately, unable to view images directly.    Lung nodules  I personally reviewed the chest images face to face with Ms Gillespie  CT chest and/22/2018 in the paraspinal region in the right lower hemithorax, extending from the level of seventh rib to the level of the eleventh rib there is a large mass containing [employer] fat attenuation and soft tissue attenuation. No significant increase in size compared to CT scan done in 9/2017. There were innumerable tiny scatter gas lucencies and groundglass nodules with central lucency throughout both lungs. Most of these measure less than 5 millimeters. Most numerous in the lower lobes. Similar to prior CT scan done in 9/2017. Nodules previously seen along the right major fissure have resolved.. A 3.5 millimeter lingular nodule is unchanged. A 5 millimeter nodule seen in the left major fissure has resolved. A 5 millimeter right lower lobe has resolved.  Sp right VATS with wedge resection of multiple RLL \"mass\", RLL wedges: \"meningothelioma\" (according to pulmonary office visit note)  I personally discussed the differential diagnosis face to face with Ms Gillespie.  Unfortunately none of the CT scan images are not available for review     Myelolipoma  CT chest and/22/2018 in the paraspinal region in the right lower hemithorax, extending from the level of seventh rib to the level of the eleventh rib there is a large mass that " measures 3.6x7.6 cm containing mixed fat attenuation and soft tissue attenuation. No significant increase in size compared to CT scan done in 9/2017.  Status post CT-guided core biopsy right posterior subpleural mass 9/2017 myelolipoma, no malignant cells   [WJ]      ED Course User Index  [WJ] Gee Tello DO         Diagnoses as of 07/22/24 2118   Chest mass   Hypoxia                       Mendez Coma Scale Score: 15                        Medical Decision Making      Procedure  Procedures     Gee Tello DO  07/22/24 2121

## 2024-07-21 NOTE — PROGRESS NOTES
"Emergency Medicine Transition of Care Note.    I received Kim Gillespie in signout from Dr. Tello.  Please see the previous ED provider note for all HPI, PE and MDM up to the time of signout. This is in addition to the primary record.    In brief Kim Gillespie is an 74 y.o. female presenting for   Chief Complaint   Patient presents with    Weakness, Gen     At the time of signout we were awaiting: Workup and admission.    ED Course as of 07/21/24 0650   Sat Jul 20, 2024   2330 Heart Rate(!): 118 [WJ]   2332 Just discovered that the patient has a new oxygen requirement. [WJ]   2333 Heart Rate(!): 118  Patient heart rate 2 days ago was 105 [WJ]   2355 Lactate: 1.7 [WJ]   Sun Jul 21, 2024   0010 Patient twelve-lead EKG inter by myself shows sinus tachycardia, ventricular 103, normal WI interval, normal QRS duration, normal QT, no STEMI. [WJ]   0030 Patient's progress care note from 2019 was reviewed.  This is copied and pasted from the note.  Unfortunately, unable to view images directly.    Lung nodules  I personally reviewed the chest images face to face with Ms Gillespie  CT chest and/22/2018 in the paraspinal region in the right lower hemithorax, extending from the level of seventh rib to the level of the eleventh rib there is a large mass containing [employer] fat attenuation and soft tissue attenuation. No significant increase in size compared to CT scan done in 9/2017. There were innumerable tiny scatter gas lucencies and groundglass nodules with central lucency throughout both lungs. Most of these measure less than 5 millimeters. Most numerous in the lower lobes. Similar to prior CT scan done in 9/2017. Nodules previously seen along the right major fissure have resolved.. A 3.5 millimeter lingular nodule is unchanged. A 5 millimeter nodule seen in the left major fissure has resolved. A 5 millimeter right lower lobe has resolved.  Sp right VATS with wedge resection of multiple RLL \"mass\", RLL wedges: \"meningothelioma\" " (according to pulmonary office visit note)  I personally discussed the differential diagnosis face to face with Ms Gillespie.  Unfortunately none of the CT scan images are not available for review     Myelolipoma  CT chest and/22/2018 in the paraspinal region in the right lower hemithorax, extending from the level of seventh rib to the level of the eleventh rib there is a large mass that measures 3.6x7.6 cm containing mixed fat attenuation and soft tissue attenuation. No significant increase in size compared to CT scan done in 9/2017.  Status post CT-guided core biopsy right posterior subpleural mass 9/2017 myelolipoma, no malignant cells   [WJ]      ED Course User Index  [WJ] Gee Tello DO         Diagnoses as of 07/21/24 0650   Chest mass   Hypoxia       Medical Decision Making  Patient's labs are notable for relatively stable but mildly abnormal LFTs with normal T. bili.  CBC shows leukocytosis to 17 from previous 13.    Patient's prior workup did identify a very large chest mass.  The patient's current medical care for this is unknown.  Additionally she had C6, C7 spinous process fractures and lumbar transverse process fracture.    Dr. Cabrera, hospitalist was contacted.  He did evaluate the patient.  He does recommend transfer which he coordinated through the transfer center.  The patient will be monitored in the emergency department until transfer can be obtained.      Final diagnoses:   [R22.2] Chest mass   [R09.02] Hypoxia           Procedure  Procedures    Magy Gilliland DO

## 2024-07-22 LAB
ANION GAP SERPL CALC-SCNC: 18 MMOL/L (ref 10–20)
BUN SERPL-MCNC: 23 MG/DL (ref 6–23)
CALCIUM SERPL-MCNC: 7.5 MG/DL (ref 8.6–10.3)
CHLORIDE SERPL-SCNC: 102 MMOL/L (ref 98–107)
CO2 SERPL-SCNC: 19 MMOL/L (ref 21–32)
CREAT SERPL-MCNC: 0.55 MG/DL (ref 0.5–1.05)
EGFRCR SERPLBLD CKD-EPI 2021: >90 ML/MIN/1.73M*2
ERYTHROCYTE [DISTWIDTH] IN BLOOD BY AUTOMATED COUNT: 14.3 % (ref 11.5–14.5)
FOLATE SERPL-MCNC: 12.4 NG/ML
GLUCOSE SERPL-MCNC: 98 MG/DL (ref 74–99)
HCT VFR BLD AUTO: 21.7 % (ref 36–46)
HGB BLD-MCNC: 7.1 G/DL (ref 12–16)
HGB RETIC QN: 38 PG (ref 28–38)
IMMATURE RETIC FRACTION: 22.4 %
IRON SATN MFR SERPL: 25 % (ref 25–45)
IRON SERPL-MCNC: 36 UG/DL (ref 35–150)
LDH SERPL L TO P-CCNC: 198 U/L (ref 84–246)
LEGIONELLA AG UR QL: NEGATIVE
MCH RBC QN AUTO: 36.4 PG (ref 26–34)
MCHC RBC AUTO-ENTMCNC: 32.7 G/DL (ref 32–36)
MCV RBC AUTO: 111 FL (ref 80–100)
NRBC BLD-RTO: 0 /100 WBCS (ref 0–0)
PLATELET # BLD AUTO: 159 X10*3/UL (ref 150–450)
POTASSIUM SERPL-SCNC: 3.7 MMOL/L (ref 3.5–5.3)
RBC # BLD AUTO: 1.95 X10*6/UL (ref 4–5.2)
RETICS #: 0.04 X10*6/UL (ref 0.02–0.11)
RETICS/RBC NFR AUTO: 2.3 % (ref 0.5–2)
S PNEUM AG UR QL: NEGATIVE
SODIUM SERPL-SCNC: 135 MMOL/L (ref 136–145)
TIBC SERPL-MCNC: 144 UG/DL (ref 240–445)
UIBC SERPL-MCNC: 108 UG/DL (ref 110–370)
WBC # BLD AUTO: 9.1 X10*3/UL (ref 4.4–11.3)

## 2024-07-22 PROCEDURE — 99233 SBSQ HOSP IP/OBS HIGH 50: CPT | Performed by: INTERNAL MEDICINE

## 2024-07-22 PROCEDURE — 83540 ASSAY OF IRON: CPT | Performed by: INTERNAL MEDICINE

## 2024-07-22 PROCEDURE — 82374 ASSAY BLOOD CARBON DIOXIDE: CPT | Performed by: INTERNAL MEDICINE

## 2024-07-22 PROCEDURE — 2500000005 HC RX 250 GENERAL PHARMACY W/O HCPCS: Performed by: INTERNAL MEDICINE

## 2024-07-22 PROCEDURE — 94640 AIRWAY INHALATION TREATMENT: CPT

## 2024-07-22 PROCEDURE — 85045 AUTOMATED RETICULOCYTE COUNT: CPT | Performed by: INTERNAL MEDICINE

## 2024-07-22 PROCEDURE — 2500000002 HC RX 250 W HCPCS SELF ADMINISTERED DRUGS (ALT 637 FOR MEDICARE OP, ALT 636 FOR OP/ED): Performed by: INTERNAL MEDICINE

## 2024-07-22 PROCEDURE — 83615 LACTATE (LD) (LDH) ENZYME: CPT | Performed by: INTERNAL MEDICINE

## 2024-07-22 PROCEDURE — 2500000004 HC RX 250 GENERAL PHARMACY W/ HCPCS (ALT 636 FOR OP/ED): Performed by: INTERNAL MEDICINE

## 2024-07-22 PROCEDURE — 99222 1ST HOSP IP/OBS MODERATE 55: CPT

## 2024-07-22 PROCEDURE — 36415 COLL VENOUS BLD VENIPUNCTURE: CPT | Performed by: INTERNAL MEDICINE

## 2024-07-22 PROCEDURE — 85027 COMPLETE CBC AUTOMATED: CPT | Performed by: INTERNAL MEDICINE

## 2024-07-22 PROCEDURE — 1100000001 HC PRIVATE ROOM DAILY

## 2024-07-22 PROCEDURE — 99223 1ST HOSP IP/OBS HIGH 75: CPT | Performed by: STUDENT IN AN ORGANIZED HEALTH CARE EDUCATION/TRAINING PROGRAM

## 2024-07-22 PROCEDURE — 94664 DEMO&/EVAL PT USE INHALER: CPT

## 2024-07-22 PROCEDURE — 82746 ASSAY OF FOLIC ACID SERUM: CPT | Mod: AHULAB | Performed by: INTERNAL MEDICINE

## 2024-07-22 RX ORDER — MONTELUKAST SODIUM 10 MG/1
10 TABLET ORAL NIGHTLY
COMMUNITY

## 2024-07-22 RX ORDER — VIT C/E/ZN/COPPR/LUTEIN/ZEAXAN 250MG-90MG
1 CAPSULE ORAL 2 TIMES DAILY
COMMUNITY

## 2024-07-22 RX ORDER — LORATADINE 10 MG/1
10 TABLET ORAL DAILY
COMMUNITY

## 2024-07-22 RX ORDER — DEXTROMETHORPHAN HYDROBROMIDE, GUAIFENESIN 5; 100 MG/5ML; MG/5ML
650 LIQUID ORAL 2 TIMES DAILY
COMMUNITY

## 2024-07-22 RX ORDER — CHOLECALCIFEROL (VITAMIN D3) 50 MCG
50 TABLET ORAL DAILY
COMMUNITY

## 2024-07-22 RX ORDER — PREDNISONE 10 MG/1
15 TABLET ORAL EVERY OTHER DAY
COMMUNITY

## 2024-07-22 RX ORDER — FLUTICASONE PROPIONATE 50 MCG
2 SPRAY, SUSPENSION (ML) NASAL DAILY
COMMUNITY

## 2024-07-22 RX ORDER — CALCIUM CARBONATE 500(1250)
1 TABLET ORAL 2 TIMES DAILY
COMMUNITY

## 2024-07-22 RX ORDER — OMEPRAZOLE 20 MG/1
20 CAPSULE, DELAYED RELEASE ORAL
COMMUNITY

## 2024-07-22 RX ADMIN — Medication 2 L/MIN: at 20:48

## 2024-07-22 RX ADMIN — BUDESONIDE 0.5 MG: 0.5 INHALANT RESPIRATORY (INHALATION) at 07:13

## 2024-07-22 RX ADMIN — LIDOCAINE 4% 1 PATCH: 40 PATCH TOPICAL at 09:57

## 2024-07-22 RX ADMIN — FORMOTEROL FUMARATE DIHYDRATE 20 MCG: 20 SOLUTION RESPIRATORY (INHALATION) at 20:47

## 2024-07-22 RX ADMIN — FORMOTEROL FUMARATE DIHYDRATE 20 MCG: 20 SOLUTION RESPIRATORY (INHALATION) at 07:13

## 2024-07-22 RX ADMIN — CEFTRIAXONE 2 G: 2 INJECTION, POWDER, FOR SOLUTION INTRAMUSCULAR; INTRAVENOUS at 04:39

## 2024-07-22 RX ADMIN — AZITHROMYCIN MONOHYDRATE 500 MG: 500 INJECTION, POWDER, LYOPHILIZED, FOR SOLUTION INTRAVENOUS at 05:39

## 2024-07-22 RX ADMIN — BUDESONIDE 0.5 MG: 0.5 INHALANT RESPIRATORY (INHALATION) at 20:48

## 2024-07-22 ASSESSMENT — PAIN SCALES - GENERAL: PAINLEVEL_OUTOF10: 0 - NO PAIN

## 2024-07-22 ASSESSMENT — COGNITIVE AND FUNCTIONAL STATUS - GENERAL
TOILETING: A LITTLE
WALKING IN HOSPITAL ROOM: A LOT
MOBILITY SCORE: 15
CLIMB 3 TO 5 STEPS WITH RAILING: A LOT
DRESSING REGULAR UPPER BODY CLOTHING: A LITTLE
TURNING FROM BACK TO SIDE WHILE IN FLAT BAD: A LITTLE
PERSONAL GROOMING: A LITTLE
DAILY ACTIVITIY SCORE: 17
HELP NEEDED FOR BATHING: A LOT
MOVING FROM LYING ON BACK TO SITTING ON SIDE OF FLAT BED WITH BEDRAILS: A LITTLE
DRESSING REGULAR LOWER BODY CLOTHING: A LOT
STANDING UP FROM CHAIR USING ARMS: A LOT
MOVING TO AND FROM BED TO CHAIR: A LITTLE

## 2024-07-22 ASSESSMENT — ACTIVITIES OF DAILY LIVING (ADL): LACK_OF_TRANSPORTATION: NO

## 2024-07-22 NOTE — NURSING NOTE
Patient is from [facility]. Called in and got a medication list faxed for her records. DON at facility name is Delia ([phone]) and confirmed pt does not have family recorded and for any questions or information regarding this patient to give her a call.

## 2024-07-22 NOTE — PROGRESS NOTES
"Kim Gillespie is a 74 y.o. female on day 1 of admission presenting with Mass in chest.    Subjective   Pending mri chest; biopsy as OP most likely with IR; doing ok, pt/ot     Hgb 7.1  Objective     Physical Exam  Vitals reviewed.   Constitutional:       Appearance: Normal appearance.   HENT:      Head: Normocephalic.      Nose: Nose normal.      Mouth/Throat:      Mouth: Mucous membranes are moist.   Pulmonary:      Effort: Pulmonary effort is normal.   Abdominal:      General: Abdomen is flat. Bowel sounds are normal.      Palpations: Abdomen is soft.   Skin:     General: Skin is warm.      Capillary Refill: Capillary refill takes less than 2 seconds.   Neurological:      General: No focal deficit present.      Mental Status: She is alert.         Last Recorded Vitals  Blood pressure 125/71, pulse 107, temperature 36.5 °C (97.7 °F), resp. rate 22, height 1.219 m (3' 11.99\"), weight (!) 33.8 kg (74 lb 8.3 oz), SpO2 91%.  Intake/Output last 3 Shifts:  I/O last 3 completed shifts:  In: 250 (7.4 mL/kg) [IV Piggyback:250]  Out: 160 (4.7 mL/kg) [Urine:160 (0.1 mL/kg/hr)]  Weight: 33.8 kg     Relevant Results  Results for orders placed or performed during the hospital encounter of 07/21/24 (from the past 24 hour(s))   CBC   Result Value Ref Range    WBC 9.1 4.4 - 11.3 x10*3/uL    nRBC 0.0 0.0 - 0.0 /100 WBCs    RBC 1.95 (L) 4.00 - 5.20 x10*6/uL    Hemoglobin 7.1 (L) 12.0 - 16.0 g/dL    Hematocrit 21.7 (L) 36.0 - 46.0 %     (H) 80 - 100 fL    MCH 36.4 (H) 26.0 - 34.0 pg    MCHC 32.7 32.0 - 36.0 g/dL    RDW 14.3 11.5 - 14.5 %    Platelets 159 150 - 450 x10*3/uL   Basic metabolic panel   Result Value Ref Range    Glucose 98 74 - 99 mg/dL    Sodium 135 (L) 136 - 145 mmol/L    Potassium 3.7 3.5 - 5.3 mmol/L    Chloride 102 98 - 107 mmol/L    Bicarbonate 19 (L) 21 - 32 mmol/L    Anion Gap 18 10 - 20 mmol/L    Urea Nitrogen 23 6 - 23 mg/dL    Creatinine 0.55 0.50 - 1.05 mg/dL    eGFR >90 >60 mL/min/1.73m*2    Calcium 7.5 " (L) 8.6 - 10.3 mg/dL       Imaging Results  IMPRESSION:  CHEST/THORACIC SPINE :  1. Study degraded by motion artifact. No acute intrathoracic  posttraumatic findings.  2. 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  3. Faint nodular ground-glass opacities at the bilateral upper lobes,  may be secondary to acute infection/inflammation.  4. Small right pleural effusion. Atelectasis at the right middle lobe  and right lower lobe.  5. Mild atelectasis at the left lower lobe.      ABDOMEN - PELVIS/LUMBAR SPINE:  1. Study degraded by motion artifact. No acute intra-abdominal  posttraumatic findings.  2. Cholelithiasis.  3. Horseshoe kidney.  4. Colonic diverticulosis.  5. Diffuse osteopenia. Lucent lesions at the bilateral iliac bones,  osseous metastases cannot be excluded. Bone scan can be obtained for  further evaluation.  6. 1.4 cm sclerotic lesion at the right iliac bone, indeterminate.          THORACIC AND LUMBAR SPINE:  1. Acute, mildly displaced, fracture at the right transverse process  of L1.  2. Compression fracture at the inferior endplate of L1 and  compression fracture at the superior endplate of L3, of indeterminate  age. Please correlate with point tenderness. If there is clinical  concern for acute/subacute fracture, MRI can be obtained for further  evaluation.  3. Multilevel degenerative changes at the lumbar spine.  4. No acute fracture of the thoracic spine. Degenerative changes.  Remote appearing compr      Medications:  azithromycin, 500 mg, intravenous, q24h  budesonide, 0.5 mg, nebulization, BID  cefTRIAXone, 2 g, intravenous, q24h  enoxaparin, 40 mg, subcutaneous, q24h  formoterol, 20 mcg, nebulization, BID  lidocaine, 1 patch, transdermal, Daily  polyethylene glycol, 17 g, oral, Daily  predniSONE, 15 mg, oral, q48h       PRN medications: acetaminophen **OR**  acetaminophen **OR** acetaminophen, ipratropium-albuteroL, ondansetron **OR** ondansetron, oxygen     Assessment/Plan   Right hemithorax mass  -need biopsy will be done OP with IR  -seen by ct surgery pending mri chest, needs PET as OP also has sclerotic lesion at right iliac bone     2.  Bilateral upper lobe pneumonia  -Continue ceftriaxone azithromycin     3.  Mildly displaced L1 right transverse process fracture with compression fracture at the inferior endplate of L1 and superior endplate of L3 of indeterminate age  -Pain control, PT OT     4.  Hemolytic anemia  -On prednisone therapy 15 mg oral every 48 hours  -hgb 7.1, check anemia work up as well     5.  Asthma  -Current inhalers    Pt/ot    DVT Prophylaxis:  HUGH Funez MD  [facility]

## 2024-07-22 NOTE — PROGRESS NOTES
Pharmacy Medication History Review   Patient came from [facility],   Called for medication list    Kim Gillespie is a 74 y.o. female admitted for Mass in chest. Pharmacy reviewed the patient's [id number] medications and allergies for accuracy.  Prior to Admission Medications   Prescriptions Last Dose Informant   acetaminophen (Tylenol 8 HOUR) 650 mg ER tablet     Sig: Take 1 tablet (650 mg) by mouth 2 times a day. Do not crush, chew, or split.   beclomethasone HFA (Qvar) 40 mcg/actuation inhaler 7/21/2024    Sig: Inhale 1 Inhalation 2 times a day. Rinse mouth with water after use to reduce aftertaste and incidence of candidiasis. Do not swallow.   calcium carbonate (Oscal) 500 mg calcium (1,250 mg) tablet     Sig: Take 1 tablet (1,250 mg) by mouth 2 times a day.   cholecalciferol (Vitamin D-3) 50 MCG (2000 UT) tablet 7/21/2024    Sig: Take 1 tablet (50 mcg) by mouth once daily.   fluticasone (Flonase) 50 mcg/actuation nasal spray     Sig: Administer 2 sprays into each nostril once daily. Shake gently. Before first use, prime pump. After use, clean tip and replace cap.   loratadine (Claritin) 10 mg tablet     Sig: Take 1 tablet (10 mg) by mouth once daily.   lubricating eye drops ophthalmic solution     Sig: Administer 1 drop into both eyes 2 times a day.   montelukast (Singulair) 10 mg tablet     Sig: Take 1 tablet (10 mg) by mouth once daily at bedtime.   multivitamin with minerals iron-free (Centrum Silver)     Sig: Take 1 tablet by mouth once daily.   omeprazole (PriLOSEC) 20 mg DR capsule     Sig: Take 1 capsule (20 mg) by mouth once daily in the morning. Take before meals. Do not crush or chew.   predniSONE (Deltasone) 10 mg tablet 7/21/2024    Sig: Take 1.5 tablets (15 mg) by mouth every other day. Aplastic Anemia   vit C,K-Ea-lgqar-lutein-zeaxan (PreserVision AREDS-2) 250-90-40-1 mg capsule     Sig: Take 1 capsule by mouth 2 times a day.      Facility-Administered Medications: None       The  list below reflectives the updated PTA list. Please review each medication in order reconciliation for additional clarification and justification.    The list below reflectives the updated allergy list. Please review each documented allergy for additional clarification and justification.  Allergies  Reviewed by Poly Castle RN on 7/21/2024   No Known Allergies         Below are additional concerns with the patient's PTA list.      Symone Ochoa

## 2024-07-22 NOTE — CONSULTS
Reason For Consult  Posterior mediastinal/chest wall mass    History Of Present Illness  Kim Gillespie is a 74 y.o. female presenting s/p fall.  Patient is overall a poor historian.    CT scans were performed in the setting of her fall, of note her chest CT revealed a large right posterior mediastinal/chest wall based mass.  Patient states that in 2018 she had some sort of small lung resection or biopsy for a 'benign' process.  Imaging was also notable for some spinal fractures as well as a right-sided rib fracture. There are additionally some lucent lesions within the pelvic bones.     Patient uses a walker at baseline/frail.     Past Medical History  She has no past medical history on file.    Surgical History  She has no past surgical history on file.     Social History  She reports that she has never smoked. She has never used smokeless tobacco. No history on file for alcohol use and drug use.    Family History  No family history on file.     Allergies  Patient has no known allergies.    Review of Systems  Constitutional: No fevers, chills, unexpected weight change  HENT: No sore throat, congestion, or nasal drainage  Eyes: No visual changes or eye itching  Respiratory:  see HPI. No cough, worsening dyspnea, wheezing  Cardiac: No chest pain, palpitations, or lower extremity edema  Gastrointestinal: No nausea, vomiting, diarrhea. No abdominal pain  Genitourinary: No dysuria or hematuria  Musculoskeletal: No back pain. No significant myalgias or arthralgias  Neurologic: No headaches, dizziness, or seizures.  Hematologic: No east bleeding or bruising.  Psychiatric: No anxiety or depression.       Physical Exam  Constitutional:       General: Patient is not in acute distress. Appears older than stated age     Appearance: Normal appearance; not ill-appearing.   HENT:      Head: Normocephalic.      Nose: No congestion or rhinorrhea.   Cardiovascular:      Rate and Rhythm: Normal rate and regular rhythm.      Pulses:  "Normal pulses.   Pulmonary:      Effort: Pulmonary effort is normal. No respiratory distress.  No conversational dyspnea     Breath sounds: No stridor. No wheezing.   Abdominal:      General: There is no distension.      Palpations: Abdomen is soft.      Tenderness: There is no abdominal tenderness.   Musculoskeletal:         General: No swelling, tenderness. Normal range of motion. Significant thoracic spine kyphosis     Cervical back: Normal range of motion. No rigidity.   Lymphadenopathy:      Cervical: No cervical adenopathy.   Skin:     General: Skin is warm and dry.   Neurological:      General: No focal deficit present.      Mental Status: Patient is alert and oriented to person, place, and time.   Psychiatric:         Mood and Affect: Mood normal.        Last Recorded Vitals  Blood pressure 125/71, pulse 107, temperature 36.5 °C (97.7 °F), resp. rate 22, height 1.219 m (3' 11.99\"), weight (!) 33.8 kg (74 lb 8.3 oz), SpO2 91%.    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  I discussed this with the patient as well    Recommend IR guided biopsy of the lesion  MRI chest to better assess any invasion in chest wall/surrounding structures  Will need sarcoma-PET scan given her pelvic bone lesions      I spent 75 minutes in the professional and overall care of this patient.      Teresa Lerner DO    "

## 2024-07-22 NOTE — PROGRESS NOTES
07/22/24 1003   Discharge Planning   Living Arrangements Alone   Support Systems Family members   Assistance Needed Uses a cane at baseline   Type of Residence Assisted living  ([facility])   Do you have animals or pets at home? No   Care Facility Name [facility]   Who is requesting discharge planning? Provider   Home or Post Acute Services None   Type of Post Acute Facility Services Assisted living   Expected Discharge Disposition Home   Does the patient need discharge transport arranged? Yes   RoundTrip coordination needed? Yes   Financial Resource Strain   How hard is it for you to pay for the very basics like food, housing, medical care, and heating? Not hard   Housing Stability   In the last 12 months, was there a time when you were not able to pay the mortgage or rent on time? N   In the past 12 months, how many times have you moved where you were living? 0   At any time in the past 12 months, were you homeless or living in a shelter (including now)? N   Transportation Needs   In the past 12 months, has lack of transportation kept you from medical appointments or from getting medications? no   In the past 12 months, has lack of transportation kept you from meetings, work, or from getting things needed for daily living? No     Met with patient at the bedside to discuss discharge plan.  Call placed to Delia VALDOVINOS at [phone]   (fax: [phone])  Per ARUNA, patient is independent with her care and if she requires assistance upon discharge, they can provide it, but will require an update prior to discharge.  They have a therapy company on site and would require an order for the patient to receive it at the AL.  Update emergency contact to include her sister, Freya Lorenzo at [phone].  PLAN: Recommend IR guided biopsy of the lesion  MRI chest to better assess any invasion in chest wall/surrounding structures  Will need sarcoma-PET scan given her pelvic bone lesions  DISP: Eb  at [facility] assisted living  ADOD: 3-4 days  Emily Bowden RN

## 2024-07-22 NOTE — CONSULTS
Consults    Reason For Consult  Right sided mediastinal mass biopsy    History Of Present Illness  Kim Gillespie is a 74 y.o. female presenting with a mechanical fall to  [place]. She was found to have a C6-C7 spinous process fracture, lucent bony lesions on b/l illiac bones, 1.4cm scerotic lesion in the right iliac bone, right transverse process of L1 with compression fracture of L1 and L3 and a 10.7 cm fat containing mass at the posterior right hemithorax extending into the adjacent mediastinum. Kim reports she slipped and fell and hit her head. She does endorse some back pain 2/10, dull but is intermittent. Has been ambulating to the bathroom without difficulty. She denies CP, SOB, N/V, new cough, neck pain, paresthesias or extremity weakness or weight loss of any significance.     Past Medical History  She has no past medical history on file.    Surgical History  She has no past surgical history on file.     Social History  She reports that she has never smoked. She has never used smokeless tobacco. No history on file for alcohol use and drug use.    Family History  No family history on file.     Allergies  Patient has no known allergies.    MEDS:    Current Facility-Administered Medications:     acetaminophen (Tylenol) tablet 650 mg, 650 mg, oral, q4h PRN **OR** acetaminophen (Tylenol) oral liquid 650 mg, 650 mg, oral, q4h PRN **OR** acetaminophen (Tylenol) suppository 650 mg, 650 mg, rectal, q4h PRN, Ya Funez MD    azithromycin (Zithromax) in dextrose 5 % in water (D5W) 250 mL  mg, 500 mg, intravenous, q24h, Ya Funez MD, Stopped at 07/22/24 0639    budesonide (Pulmicort) 0.5 mg/2 mL nebulizer solution 0.5 mg, 0.5 mg, nebulization, BID, Ya Funez MD, 0.5 mg at 07/22/24 0713    cefTRIAXone (Rocephin) in dextrose 5% IV 2 g, 2 g, intravenous, q24h, Ya Funez MD, Stopped at 07/22/24 0509    enoxaparin (Lovenox) syringe 40 mg, 40 mg, subcutaneous, q24h, Ya Funez MD, 40 mg at  07/21/24 1139    formoterol (Perforomist) 20 mcg/2 mL nebulizer solution 20 mcg, 20 mcg, nebulization, BID, Ya Funez MD, 20 mcg at 07/22/24 0713    ipratropium-albuteroL (Duo-Neb) 0.5-2.5 mg/3 mL nebulizer solution 3 mL, 3 mL, nebulization, q2h PRN, Ya Funez MD    lidocaine 4 % patch 1 patch, 1 patch, transdermal, Daily, Ya Funez MD, 1 patch at 07/22/24 0957    ondansetron (Zofran) tablet 4 mg, 4 mg, oral, q8h PRN **OR** ondansetron (Zofran) injection 4 mg, 4 mg, intravenous, q8h PRN, Ya Funez MD    oxygen (O2) therapy, , inhalation, Continuous PRN - O2/gases, Ya Funez MD, 2 L/min at 07/21/24 2037    polyethylene glycol (Glycolax, Miralax) packet 17 g, 17 g, oral, Daily, Ya Funez MD, 17 g at 07/21/24 1139    predniSONE (Deltasone) tablet 15 mg, 15 mg, oral, q48h, Ya Funez MD, 15 mg at 07/21/24 1320    Review of Systems  Respiratory ROS: no cough, shortness of breath, or wheezing  Cardiovascular ROS: no chest pain or dyspnea on exertion  Gastrointestinal ROS: no abdominal pain, change in bowel habits, or black or bloody stools  Musculoskeletal ROS: positive for - pain in back - midline about the level of L1  Neurological ROS: negative     Last Recorded Vitals  /71 (BP Location: Right arm, Patient Position: Lying)   Pulse 107   Temp 36.5 °C (97.7 °F)   Resp 22   Wt (!) 33.8 kg (74 lb 8.3 oz)   SpO2 91%      Physical Exam  Orientation: oriented to person, place, time, and general circumstances  HEENT: normocephalic, atraumatic  Pulm: clear to auscultation bilaterally, no wheezes, good air entry  Cardiac: Regular rate and rhythm or without murmur or extra heart sounds  GI: soft, nontender, normal bowel sounds  Pulses: peripheral pulses symmetrical    Relevant Results    LABS:  Lab Results   Component Value Date    WBC 9.1 07/22/2024    HGB 7.1 (L) 07/22/2024    HCT 21.7 (L) 07/22/2024     (H) 07/22/2024     07/22/2024      Results from last 72  "hours   Lab Units 07/22/24  0531   SODIUM mmol/L 135*   POTASSIUM mmol/L 3.7   CHLORIDE mmol/L 102   CO2 mmol/L 19*   BUN mg/dL 23   CREATININE mg/dL 0.55   GLUCOSE mg/dL 98   CALCIUM mg/dL 7.5*   ANION GAP mmol/L 18   EGFR mL/min/1.73m*2 >90     Results from last 72 hours   Lab Units 07/21/24  0508   ALK PHOS U/L 127   BILIRUBIN TOTAL mg/dL 0.9   PROTEIN TOTAL g/dL 5.2*   ALT U/L 80*   AST U/L 60*   ALBUMIN g/dL 2.8*           No lab exists for component: \"PT\"    MICRO:  No results found for the last 14 days.      IMAGING:   MR chest w and wo IV contrast    (Results Pending)   Consult to Interventional Radiology    (Results Pending)          Assessment/Plan     This is a 74 y.o. female presenting with a mechanical fall to [facility]. She was found to have a C6-C7 spinous process fracture, lucent bony lesions on b/l illiac bones, 1.4cm scerotic lesion in the right iliac bone, right transverse process of L1 with compression fracture of L1 and L3 and a 10.7 cm fat containing mass at the posterior right hemithorax extending into the adjacent mediastinum. Kim reports she slipped and fell and hit her head. She does endorse some back pain 2/10, dull but is intermittent. Has been ambulating to the bathroom without difficulty. She denies CP, SOB, N/V, new cough, neck pain, paresthesias or extremity weakness.    CT suggests this could be suspicious for liposarcoma which have a risk of needle tract seeding. Appreciate CT surgery input, rec MRI/PET scan and biopsy as next step. We would rec these tests as well. I did discuss the procedure with the patient as well as risks and benefits and she is agreeable to move forward with the biopsy. We would like to bring Kim back Friday morning for a CT guided mediastinal mass biopsy.     Risks were discussed including but not limited to pain at insertion site, infection, bleeding and pneumothorax, needle tract seeding. Benefits of the procedure and alternatives to treatment were also " reviewed. Patient verbalizes understanding and wishes to proceed. They will further discuss with IR attending prior to procedure.     We will coordinate with the facility to bring back Friday.     FIDELIA Easley-CNP    Time : Billing Time  Prep time on date of the patient encounter: 15 minutes.   Time spent directly with patient/family/caregiver: 25 minutes.   Documentation time: 15 minutes.   Total time (minutes):  55 minutes  Time Spent with this Patient (minutes).  More than 50% of This Time was Spent in Counseling and/or Coordination of Care

## 2024-07-22 NOTE — CARE PLAN
The patient's goals for the shift include      The clinical goals for the shift include Patient to remain safe and free from injury throughout shift.    Over the shift, the patient did not make progress toward the following goals. Barriers to progression include . Recommendations to address these barriers include .

## 2024-07-23 ENCOUNTER — APPOINTMENT (OUTPATIENT)
Dept: RADIOLOGY | Facility: HOSPITAL | Age: 74
End: 2024-07-23
Payer: COMMERCIAL

## 2024-07-23 LAB
ANION GAP SERPL CALC-SCNC: 13 MMOL/L (ref 10–20)
BUN SERPL-MCNC: 18 MG/DL (ref 6–23)
CALCIUM SERPL-MCNC: 7.7 MG/DL (ref 8.6–10.3)
CHLORIDE SERPL-SCNC: 102 MMOL/L (ref 98–107)
CO2 SERPL-SCNC: 23 MMOL/L (ref 21–32)
CREAT SERPL-MCNC: 0.56 MG/DL (ref 0.5–1.05)
EGFRCR SERPLBLD CKD-EPI 2021: >90 ML/MIN/1.73M*2
ERYTHROCYTE [DISTWIDTH] IN BLOOD BY AUTOMATED COUNT: 14.5 % (ref 11.5–14.5)
GLUCOSE SERPL-MCNC: 81 MG/DL (ref 74–99)
HCT VFR BLD AUTO: 22.8 % (ref 36–46)
HGB BLD-MCNC: 7.4 G/DL (ref 12–16)
MCH RBC QN AUTO: 36.8 PG (ref 26–34)
MCHC RBC AUTO-ENTMCNC: 32.5 G/DL (ref 32–36)
MCV RBC AUTO: 113 FL (ref 80–100)
NRBC BLD-RTO: 0 /100 WBCS (ref 0–0)
PLATELET # BLD AUTO: 196 X10*3/UL (ref 150–450)
POTASSIUM SERPL-SCNC: 3.7 MMOL/L (ref 3.5–5.3)
RBC # BLD AUTO: 2.01 X10*6/UL (ref 4–5.2)
SODIUM SERPL-SCNC: 134 MMOL/L (ref 136–145)
WBC # BLD AUTO: 9.9 X10*3/UL (ref 4.4–11.3)

## 2024-07-23 PROCEDURE — 85027 COMPLETE CBC AUTOMATED: CPT | Performed by: INTERNAL MEDICINE

## 2024-07-23 PROCEDURE — 71552 MRI CHEST W/O & W/DYE: CPT

## 2024-07-23 PROCEDURE — 80048 BASIC METABOLIC PNL TOTAL CA: CPT | Performed by: INTERNAL MEDICINE

## 2024-07-23 PROCEDURE — 36415 COLL VENOUS BLD VENIPUNCTURE: CPT | Performed by: INTERNAL MEDICINE

## 2024-07-23 PROCEDURE — 94640 AIRWAY INHALATION TREATMENT: CPT

## 2024-07-23 PROCEDURE — 1100000001 HC PRIVATE ROOM DAILY

## 2024-07-23 PROCEDURE — 2500000005 HC RX 250 GENERAL PHARMACY W/O HCPCS: Performed by: INTERNAL MEDICINE

## 2024-07-23 PROCEDURE — 2500000002 HC RX 250 W HCPCS SELF ADMINISTERED DRUGS (ALT 637 FOR MEDICARE OP, ALT 636 FOR OP/ED): Performed by: INTERNAL MEDICINE

## 2024-07-23 PROCEDURE — A9575 INJ GADOTERATE MEGLUMI 0.1ML: HCPCS | Performed by: INTERNAL MEDICINE

## 2024-07-23 PROCEDURE — 99232 SBSQ HOSP IP/OBS MODERATE 35: CPT | Performed by: INTERNAL MEDICINE

## 2024-07-23 PROCEDURE — 71552 MRI CHEST W/O & W/DYE: CPT | Performed by: STUDENT IN AN ORGANIZED HEALTH CARE EDUCATION/TRAINING PROGRAM

## 2024-07-23 PROCEDURE — 97161 PT EVAL LOW COMPLEX 20 MIN: CPT | Mod: GP

## 2024-07-23 PROCEDURE — 2550000001 HC RX 255 CONTRASTS: Performed by: INTERNAL MEDICINE

## 2024-07-23 PROCEDURE — 2500000004 HC RX 250 GENERAL PHARMACY W/ HCPCS (ALT 636 FOR OP/ED): Performed by: INTERNAL MEDICINE

## 2024-07-23 RX ORDER — GADOTERATE MEGLUMINE 376.9 MG/ML
7 INJECTION INTRAVENOUS
Status: COMPLETED | OUTPATIENT
Start: 2024-07-23 | End: 2024-07-23

## 2024-07-23 RX ADMIN — BUDESONIDE 0.5 MG: 0.5 INHALANT RESPIRATORY (INHALATION) at 20:32

## 2024-07-23 RX ADMIN — FORMOTEROL FUMARATE DIHYDRATE 20 MCG: 20 SOLUTION RESPIRATORY (INHALATION) at 08:15

## 2024-07-23 RX ADMIN — Medication 1 L/MIN: at 20:33

## 2024-07-23 RX ADMIN — AZITHROMYCIN MONOHYDRATE 500 MG: 500 INJECTION, POWDER, LYOPHILIZED, FOR SOLUTION INTRAVENOUS at 05:08

## 2024-07-23 RX ADMIN — Medication 2 L/MIN: at 08:15

## 2024-07-23 RX ADMIN — ENOXAPARIN SODIUM 40 MG: 40 INJECTION SUBCUTANEOUS at 10:18

## 2024-07-23 RX ADMIN — FORMOTEROL FUMARATE DIHYDRATE 20 MCG: 20 SOLUTION RESPIRATORY (INHALATION) at 20:33

## 2024-07-23 RX ADMIN — PREDNISONE 15 MG: 5 TABLET ORAL at 15:09

## 2024-07-23 RX ADMIN — LIDOCAINE 4% 1 PATCH: 40 PATCH TOPICAL at 10:17

## 2024-07-23 RX ADMIN — GADOTERATE MEGLUMINE 7 ML: 376.9 INJECTION INTRAVENOUS at 13:25

## 2024-07-23 RX ADMIN — BUDESONIDE 0.5 MG: 0.5 INHALANT RESPIRATORY (INHALATION) at 08:15

## 2024-07-23 RX ADMIN — CEFTRIAXONE 2 G: 2 INJECTION, POWDER, FOR SOLUTION INTRAMUSCULAR; INTRAVENOUS at 04:15

## 2024-07-23 ASSESSMENT — COGNITIVE AND FUNCTIONAL STATUS - GENERAL
MOVING TO AND FROM BED TO CHAIR: A LITTLE
WALKING IN HOSPITAL ROOM: TOTAL
STANDING UP FROM CHAIR USING ARMS: A LITTLE
CLIMB 3 TO 5 STEPS WITH RAILING: TOTAL
HELP NEEDED FOR BATHING: A LITTLE
MOVING FROM LYING ON BACK TO SITTING ON SIDE OF FLAT BED WITH BEDRAILS: A LITTLE
MOVING FROM LYING ON BACK TO SITTING ON SIDE OF FLAT BED WITH BEDRAILS: A LITTLE
DRESSING REGULAR UPPER BODY CLOTHING: A LITTLE
DRESSING REGULAR LOWER BODY CLOTHING: A LITTLE
TURNING FROM BACK TO SIDE WHILE IN FLAT BAD: A LITTLE
MOVING TO AND FROM BED TO CHAIR: A LITTLE
PERSONAL GROOMING: A LITTLE
STANDING UP FROM CHAIR USING ARMS: A LITTLE
MOBILITY SCORE: 13
CLIMB 3 TO 5 STEPS WITH RAILING: A LOT
TOILETING: A LITTLE
DAILY ACTIVITIY SCORE: 19
WALKING IN HOSPITAL ROOM: A LITTLE
TURNING FROM BACK TO SIDE WHILE IN FLAT BAD: A LOT
MOBILITY SCORE: 17

## 2024-07-23 ASSESSMENT — PAIN - FUNCTIONAL ASSESSMENT
PAIN_FUNCTIONAL_ASSESSMENT: 0-10

## 2024-07-23 ASSESSMENT — PAIN SCALES - GENERAL
PAINLEVEL_OUTOF10: 0 - NO PAIN

## 2024-07-23 NOTE — PROGRESS NOTES
07/23/24 1418   Discharge Planning   Home or Post Acute Services Post acute facilities (Rehab/SNF/etc)   Type of Post Acute Facility Services Skilled nursing   Expected Discharge Disposition SNF   Does the patient need discharge transport arranged? Yes   RoundTrip coordination needed? Yes     Spoke with ARUNA Lin, from the [facility].  Due to patient being independent prior to hospital admission, they would like her to go to a SNF prior to returning.  IF patient adamantly refuses SNF, Delia, requested [facility] call her to let her know and they may be able to figure something out.  Patient currently at a [facility].  Will have  provide SNF list once she returns.  Emily Bowden RN

## 2024-07-23 NOTE — PROGRESS NOTES
"Kim Gillespie is a 74 y.o. female on day 2 of admission presenting with Mass in chest.    Subjective   No acute events overnight. No acute events overnight. Feeling pretty well.        Objective     VITALS  Blood pressure 125/62, pulse 96, temperature 36.6 °C (97.8 °F), temperature source Temporal, resp. rate 18, height 1.219 m (3' 11.99\"), weight (!) 31.8 kg (70 lb), SpO2 98%.  Physical Exam  Vitals reviewed.   Constitutional:       Appearance: Normal appearance.   HENT:      Head: Normocephalic.      Nose: Nose normal.      Mouth/Throat:      Mouth: Mucous membranes are moist.   Pulmonary:      Effort: Pulmonary effort is normal.   Abdominal:      General: Abdomen is flat. Bowel sounds are normal.      Palpations: Abdomen is soft.   Skin:     General: Skin is warm.      Capillary Refill: Capillary refill takes less than 2 seconds.   Neurological:      General: No focal deficit present.      Mental Status: She is alert.           Intake/Output last 3 Shifts:  I/O last 3 completed shifts:  In: 350 (10.4 mL/kg) [IV Piggyback:350]  Out: - (0 mL/kg)   Weight: 33.8 kg     Relevant Results  Results for orders placed or performed during the hospital encounter of 07/21/24 (from the past 24 hour(s))   CBC   Result Value Ref Range    WBC 9.9 4.4 - 11.3 x10*3/uL    nRBC 0.0 0.0 - 0.0 /100 WBCs    RBC 2.01 (L) 4.00 - 5.20 x10*6/uL    Hemoglobin 7.4 (L) 12.0 - 16.0 g/dL    Hematocrit 22.8 (L) 36.0 - 46.0 %     (H) 80 - 100 fL    MCH 36.8 (H) 26.0 - 34.0 pg    MCHC 32.5 32.0 - 36.0 g/dL    RDW 14.5 11.5 - 14.5 %    Platelets 196 150 - 450 x10*3/uL   Basic metabolic panel   Result Value Ref Range    Glucose 81 74 - 99 mg/dL    Sodium 134 (L) 136 - 145 mmol/L    Potassium 3.7 3.5 - 5.3 mmol/L    Chloride 102 98 - 107 mmol/L    Bicarbonate 23 21 - 32 mmol/L    Anion Gap 13 10 - 20 mmol/L    Urea Nitrogen 18 6 - 23 mg/dL    Creatinine 0.56 0.50 - 1.05 mg/dL    eGFR >90 >60 mL/min/1.73m*2    Calcium 7.7 (L) 8.6 - 10.3 mg/dL "       Imaging Results  MR chest w and wo IV contrast    (Results Pending)   CT guided percutaneous biopsy mediastinum    (Results Pending)       Medications:  budesonide, 0.5 mg, nebulization, BID  cefTRIAXone, 2 g, intravenous, q24h  enoxaparin, 40 mg, subcutaneous, q24h  formoterol, 20 mcg, nebulization, BID  lidocaine, 1 patch, transdermal, Daily  polyethylene glycol, 17 g, oral, Daily  predniSONE, 15 mg, oral, q48h       PRN medications: acetaminophen **OR** acetaminophen **OR** acetaminophen, ipratropium-albuteroL, ondansetron **OR** ondansetron, oxygen        Assessment/Plan   Principal Problem:    Mass in chest    Right hemithorax mass  -need biopsy will be done OP with IR currently scheduled for 8/2/24 per IR note   -seen by ct surgery pending mri chest, needs PET as OP also has sclerotic lesion at right iliac bone     2.  Bilateral upper lobe pneumonia  -Continue ceftriaxone azithromycin     3.  Mildly displaced L1 right transverse process fracture with compression fracture at the inferior endplate of L1 and superior endplate of L3 of indeterminate age  -Pain control, PT OT     4.  Hemolytic anemia  -On prednisone therapy 15 mg oral every 48 hours  -hgb 7.1, check anemia work up as well     5.  Asthma  -Current inhalers    DVT Prophylaxis:  Lovenox subq    Disposition:  PT/OT to see suspect will need placement.     Juan Chavez, [facility]

## 2024-07-23 NOTE — SIGNIFICANT EVENT
CT guided biopsy tentatively scheduled for Friday 7/26. I was notified by scheduling that insurance precert is pending and will not likely be authorized by Friday. Order was changed to Stat. Precert has been started, will plan for biopsy Friday 8/2 pending precert.

## 2024-07-23 NOTE — PROGRESS NOTES
Physical Therapy    Physical Therapy Evaluation    Patient Name: Kim Gillespie  MRN: [id number]  Today's Date: 7/23/2024   Time Calculation  Start Time: 0919  Stop Time: 0939  Time Calculation (min): 20 min    Assessment/Plan   PT Assessment  PT Assessment Results: Decreased strength, Decreased range of motion, Decreased endurance, Impaired balance, Decreased mobility, Pain, Decreased safety awareness, Impaired judgement  Rehab Prognosis: Good  End of Session Communication: Bedside nurse  Assessment Comment: PT Evaluation Completed. The patient presented with decreased mobility, balance, endurance, safety awareness, and increased fatigue. These impairments are negatively impacting her ability to perform at baseline functional level, in home environment. The patient is at risk of falls & injury. This patient would benefit from skilled therapy intervention to address limitations and progress towards the PT goals.  Anticipate moderate frequency PT needs at discharge  End of Session Patient Position: Alarm on, Up in chair  IP OR SWING BED PT PLAN  Inpatient or Swing Bed: Inpatient  PT Plan  Treatment/Interventions: Bed mobility, Transfer training, Gait training, Balance training, Strengthening, Endurance training, Therapeutic exercise, Range of motion, Therapeutic activity, Home exercise program  PT Plan: Ongoing PT  PT Frequency: 3 times per week  PT Discharge Recommendations: Moderate intensity level of continued care  PT Recommended Transfer Status: Assist x1  PT - OK to Discharge: Yes (PT POC established.)      Subjective   General Visit Information:  General  Reason for Referral: To ED s/p fall at [facility] with AMS. Imaging found a tranverse fx at L1, L2-3 compression fx, and C6-7 spinous process fx.  Referred By: Ya Funez MD  Past Medical History Relevant to Rehab: History reviewed. No pertinent past medical history.    Prior to Session Communication: Bedside nurse  Patient Position Received: Bed, 3 rail up, Alarm  on  General Comment: Pt pleasant and agreeable to PT eval.  Home Living:  Home Living  Type of Home: Assisted living  Lives With: Alone  Home Adaptive Equipment: Cane, Walker rolling or standard  Home Layout: One level  Home Access: Level entry  Prior Level of Function:  Prior Function Per Pt/Caregiver Report  Level of [place]: Independent with ADLs and functional transfers, Independent with homemaking with ambulation  ADL Assistance:  (IND dressing, assists with sponge bathing)  Homemaking Assistance: Needs assistance  Ambulatory Assistance: Independent (using cane at baseline)  Prior Function Comments: Does not drive. Denies any additional falls besides fall PTA.  Precautions:  Precautions  Hearing/Visual Limitations: hard of hearing  Medical Precautions: Fall precautions, Spinal precautions    Objective   Pain:  Pain Assessment  Pain Assessment: 0-10  0-10 (Numeric) Pain Score: 0 - No pain  Cognition:  Cognition  Overall Cognitive Status: Impaired  Orientation Level: Oriented X4  Safety/Judgement: Exceptions to WFL  Insight: Moderate  Processing Speed: Delayed    General Assessments:  Activity Tolerance  Endurance: Tolerates 10 - 20 min exercise with multiple rests    Sensation  Light Touch: No apparent deficits    Postural Control  Postural Control: Within Functional Limits    Static Sitting Balance  Static Sitting-Balance Support: Feet supported, Bilateral upper extremity supported  Static Sitting-Level of Assistance: Close supervision  Dynamic Sitting Balance  Dynamic Sitting-Balance Support: Feet supported, Bilateral upper extremity supported  Dynamic Sitting-Comments: Close supervision    Static Standing Balance  Static Standing-Balance Support: Bilateral upper extremity supported  Static Standing-Level of Assistance: Contact guard  Dynamic Standing Balance  Dynamic Standing-Balance Support: Bilateral upper extremity supported  Dynamic Standing-Comments: Contact guard  Functional Assessments:  Bed  Mobility  Bed Mobility: Yes  Bed Mobility 1  Bed Mobility 1: Supine to sitting  Level of Assistance 1: Moderate assistance  Bed Mobility Comments 1: Max cues for sequencing and hand placement. Pt educated on log roll technique. Pt very slow moving. Assist to maneuver LEs and trunk.    Transfers  Transfer: Yes  Transfer 1  Technique 1: Sit to stand, Stand to sit  Transfer Device 1: Walker  Transfer Level of Assistance 1: Contact guard  Trials/Comments 1: Cues for hand placement and scooting to the EOB.    Ambulation/Gait Training  Ambulation/Gait Training Performed: Yes  Ambulation/Gait Training 1  Surface 1: Level tile  Device 1: Rolling walker  Assistance 1: Minimum assistance  Comments/Distance (ft) 1: 3 ft bed to chair. Cues for walker placement and seqeucning. Pt attempting to leave walker several feet away from chair.  Extremity/Trunk Assessments:  RUE   RUE : Within Functional Limits  LUE   LUE: Within Functional Limits  RLE   RLE :  (Strength >3/5 based on observation of functional mobility. ROM WFL.)  LLE   LLE :  (Strength >3/5 based on observation of functional mobility. ROM WFL.)  Outcome Measures:  [facility] Basic Mobility  Turning from your back to your side while in a flat bed without using bedrails: A little  Moving from lying on your back to sitting on the side of a flat bed without using bedrails: A lot  Moving to and from bed to chair (including a wheelchair): A little  Standing up from a chair using your arms (e.g. wheelchair or bedside chair): A little  To walk in hospital room: Total  Climbing 3-5 steps with railing: Total  Basic Mobility - Total Score: 13    Encounter Problems       Encounter Problems (Active)       Balance       complete all mobility with normal balance while dual tasking, negotiating in a dynamic environment, carrying items, etc., with proactive and reactive static and dynamic standing and sitting tasks, mod I and a RW, >15 minutes.       Start:  07/23/24    Expected End:   08/06/24               Mobility       STG - Patient will ambulate 75 ft mod I with a Rw.        Start:  07/23/24    Expected End:  08/06/24               PT Transfers       STG - Patient will perform bed mobility independently using log roll technique.        Start:  07/23/24    Expected End:  08/06/24            STG - Patient will transfer sit to and from stand mod I with a RW.        Start:  07/23/24    Expected End:  08/06/24               Pain - Adult              Education Documentation  Precautions, taught by Narda Larson PT at 7/23/2024 10:38 AM.  Learner: Patient  Readiness: Acceptance  Method: Explanation  Response: Verbalizes Understanding    Body Mechanics, taught by Narda Larson PT at 7/23/2024 10:38 AM.  Learner: Patient  Readiness: Acceptance  Method: Explanation  Response: Verbalizes Understanding    Mobility Training, taught by Narda Larson, PT at 7/23/2024 10:38 AM.  Learner: Patient  Readiness: Acceptance  Method: Explanation  Response: Verbalizes Understanding    Education Comments  No comments found.

## 2024-07-23 NOTE — CONSULTS
Nutrition Assessment Note  Nutrition Assessment      Reason for Assessment  Reason for Assessment: Admission nursing screening MST- 2 (wt loss and poor appetite)    Chart reviewed and pt visited.    Kim is a 74 y.o. female admitted for mass in chest  PMH includes: asthma, hemolytic anemia, recent fall at nursing home    Per chart review:  -CT guided biopsy tentatively scheduled for 8/2  -No n/v  -Flowsheets show 0-50% po intake since admission    Pt states:  -no food allergies  -no choking concerns  -not a big eater in general  -declines nutritional supplements at this time    Scheduled medications  budesonide, 0.5 mg, nebulization, BID  cefTRIAXone, 2 g, intravenous, q24h  enoxaparin, 40 mg, subcutaneous, q24h  formoterol, 20 mcg, nebulization, BID  lidocaine, 1 patch, transdermal, Daily  polyethylene glycol, 17 g, oral, Daily  predniSONE, 15 mg, oral, q48h      Continuous medications     PRN medications  PRN medications: acetaminophen **OR** acetaminophen **OR** acetaminophen, ipratropium-albuteroL, ondansetron **OR** ondansetron, oxygen     Latest Reference Range & Units 07/22/24 05:31 07/23/24 05:13   GLUCOSE 74 - 99 mg/dL 98 81   SODIUM 136 - 145 mmol/L 135 (L) 134 (L)   POTASSIUM 3.5 - 5.3 mmol/L 3.7 3.7   CHLORIDE 98 - 107 mmol/L 102 102   Bicarbonate 21 - 32 mmol/L 19 (L) 23   Anion Gap 10 - 20 mmol/L 18 13   Blood Urea Nitrogen 6 - 23 mg/dL 23 18   Creatinine 0.50 - 1.05 mg/dL 0.55 0.56   EGFR >60 mL/min/1.73m*2 >90 >90   Calcium 8.6 - 10.3 mg/dL 7.5 (L) 7.7 (L)   FOLATE >5.0 ng/mL 12.4    IRON 35 - 150 ug/dL 36    LDH 84 - 246 U/L 198    TIBC 240 - 445 ug/dL 144 (L)    UIBC 110 - 370 ug/dL 108 (L)    % Saturation 25 - 45 % 25    (L): Data is abnormally low    Dietary Orders (From admission, onward)       Start     Ordered    07/22/24 1229  Adult diet Regular  Diet effective now        Question:  Diet type  Answer:  Regular    07/22/24 1228                  History:  Food and Nutrient History  Energy  "Intake: Poor < 50 %  Food and Nutrient History: Pt states she eats 3 meals daily. Normally eats smaller meals but over the past week has had a poor appetite, eating less than 25%.  Vitamin/Herbal Supplement Use: Pt denies use of nuitritional supplements    Anthropometrics:  Height: 121.9 cm (3' 11.99\")  Weight: (!) 31.8 kg (70 lb)  BMI (Calculated): 21.37    Weight Change: -6.06    Weight         7/21/2024  1057 7/23/2024  1449          Weight: 33.8 kg (74 lb 8.3 oz) 31.8 kg (70 lb)            Weight Change  Significant Weight Loss: Yes  Interpretation of Weight Loss: >2% in 1 week       IBW/kg (Dietitian Calculated): 34.5 kg  Percent of IBW: 92 %     Energy Needs:    Estimated Energy Needs  Total Energy Estimated Needs (kCal): 950 kCal  Total Estimated Energy Need per Day (kCal/kg): 1050 kCal/kg  Method for Estimating Needs: 30-33kcals/kg    Estimated Protein Needs  Total Protein Estimated Needs (g): 30 g  Total Protein Estimated Needs (g/kg): 40 g/kg  Method for Estimating Needs: 1.0-1.2g/kg    Estimated Fluid Needs  Method for Estimating Needs: 1mL/kcals or MD recommendations       Nutrition Focused Physical Findings:  Subcutaneous Fat Loss  Orbital Fat Pads: Mild-Moderate (slight dark circles and slight hollowing)  Buccal Fat Pads: Mild-Moderate (flat cheeks, minimal bounce)    Muscle Wasting  Temporalis: Mild-Moderate (slight depression)    Edema  Edema: none       Physical Findings (Nutrition Deficiency/Toxicity)  Skin: Positive (Jaundice)       Nutrition Diagnosis   Malnutrition Diagnosis  Patient has Malnutrition Diagnosis: Yes  Diagnosis Status: New  Malnutrition Diagnosis: Severe malnutrition related to acute disease or injury  As Evidenced by: greater than 2% weight loss in 1 week, prolonged poor intake prior to hopsital admit less than 50% of estimated energy needs for greater than 5 days, mild fat losses, and moderate muscle loss.  Additional Assessment Information: Muscle loss could be d/t older " age    Nutrition Interventions/Recommendations      Food and/or Nutrient Delivery Interventions  Meals and Snacks: General healthful diet     Offered nutritional supplements- pt declined. Should po intake remain sub-optimal, pt may benefit from nutritional supplementation offering again.    Additional Interventions: Should appetite remain sub-optimal, consider appetite stimulant.    Nutrition Monitoring and Evaluation   Food and Nutrient Related History  Energy Intake: Estimated energy intake  Criteria: >75% of EEN via po    Fluid Intake: Estimated fluid intake    Amount of Food: Estimated amout of food  Criteria: >75% of meal trays    Anthropometrics: Body Composition/Growth/Weight History  Weight: Measured weight, Weight change    Weight Change: Weight gain, Weight loss, Weight change percentage    Body Mass: Body mass index (BMI)    Biochemical Data, Medical Tests and Procedures  Electrolyte and Renal Panel: Sodium, Calcium, serum, Calcium, ionized  Criteria: As clinically indicated    Nutritional Anemia Profile: Total iron binding capacity, Other (Comment)  Criteria: As clinically indicated       Nutrition Focused Physical Findings  Adipose: Loss of subcutaneous fat       Digestive System: Decrease in appetite    Muscles: Muscle atrophy       Other: overall appearance and I/Os       Follow Up  Time Spent (min): 60 minutes  Last Date of Nutrition Visit: 07/23/24  Nutrition Follow-Up Needed?: Dietitian to reassess per policy  Follow up Comment: VERONA Vazquez

## 2024-07-23 NOTE — PROGRESS NOTES
Occupational Therapy                 Therapy Communication Note    Patient Name: Kim Gillespie  MRN: [id number]  Today's Date: 7/23/2024     Discipline: Occupational Therapy    Missed Visit Reason: Missed Visit Reason: Patient in a medical procedure (2nd attempt, pt still in MRI.)    Missed Time: Attempt    Comment:

## 2024-07-23 NOTE — SIGNIFICANT EVENT
Kim Gillespie is a 74 y.o. female presenting with history of GUNNAR, GERD, hypoplastic anemia, Danielson esophagus, melanoma of left arm, frequent falls, past CT guided biopsy positive for myelolipoma as well as right lung wedge resection in 2018, no malignant cells at that time.  Patient was at her nursing home suffering a fall while getting out of bed slipping, landing on her right side, patient verbalizes she also hit her head but did not lose consciousness as well as mild hypoxia.   CT head negative for hemorrhage, acute nondisplaced fracture of C6 spinous process, mildly displaced fracture at spinous process of C7 along with soft tissue contusion at the right paraspinal soft tissue at C6-C7 level also CT findings showed mildly displaced fracture at right transverse process of L1, compression fracture at the superior endplate of L3, No acute fracture of the thoracic spine. Degenerative changes. Remote appearing compression deformity at T12 with anterior wedging.  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 Cholelithiasis, colonic diverticulosis, diffuse osteopenia, and lucent lesions Lucent at the bilateral iliac bones, osseous metastases cannot be excluded.  Thoracic surgery is being consulted for evaluation of fat containing mass see at right posterior right hemithorax.      Patient seen laying in bed on NC and in no acute distress. She does not verbalize any complaints at this time.      Ongoing plan:     Awaiting MRI (ordered)    IR consulted, saw patient yesterday, per notes tentative plan is for CT guided biopsy of right sided mass on Friday 7/26.    Will continue to follow peripherally    Discussed with Dr. Lerner.

## 2024-07-23 NOTE — PROGRESS NOTES
Occupational Therapy                 Therapy Communication Note    Patient Name: Kim Gillespie  MRN: [id number]  Today's Date: 7/23/2024     Discipline: Occupational Therapy    Missed Visit Reason: Missed Visit Reason: Patient in a medical procedure (per RN, in MRI)    Missed Time: Attempt    Comment:

## 2024-07-23 NOTE — CARE PLAN
The patient's goals for the shift include to remain safe.      The clinical goals for the shift include maintain safety.    Over the shift, the patient did not make progress toward the following goals. Barriers to progression include weakness. Recommendations to address these barriers include safety precautions.

## 2024-07-23 NOTE — PROGRESS NOTES
07/23/24 1447   Discharge Planning   Expected Discharge Disposition SNF   Patient Choice   Provider Choice list and CMS website (https://medicare.gov/care-compare#search) for post-acute Quality and Resource Measure Data were provided and reviewed with: Patient

## 2024-07-24 VITALS
WEIGHT: 70 LBS | RESPIRATION RATE: 18 BRPM | SYSTOLIC BLOOD PRESSURE: 137 MMHG | HEIGHT: 55 IN | DIASTOLIC BLOOD PRESSURE: 80 MMHG | TEMPERATURE: 97.8 F | HEART RATE: 107 BPM | OXYGEN SATURATION: 94 % | BODY MASS INDEX: 16.2 KG/M2

## 2024-07-24 LAB
ANION GAP SERPL CALC-SCNC: 15 MMOL/L (ref 10–20)
BUN SERPL-MCNC: 16 MG/DL (ref 6–23)
CALCIUM SERPL-MCNC: 7.2 MG/DL (ref 8.6–10.3)
CHLORIDE SERPL-SCNC: 101 MMOL/L (ref 98–107)
CO2 SERPL-SCNC: 22 MMOL/L (ref 21–32)
CREAT SERPL-MCNC: 0.47 MG/DL (ref 0.5–1.05)
EGFRCR SERPLBLD CKD-EPI 2021: >90 ML/MIN/1.73M*2
ERYTHROCYTE [DISTWIDTH] IN BLOOD BY AUTOMATED COUNT: 14.5 % (ref 11.5–14.5)
GLUCOSE SERPL-MCNC: 84 MG/DL (ref 74–99)
HCT VFR BLD AUTO: 23.4 % (ref 36–46)
HGB BLD-MCNC: 7.4 G/DL (ref 12–16)
MCH RBC QN AUTO: 35.9 PG (ref 26–34)
MCHC RBC AUTO-ENTMCNC: 31.6 G/DL (ref 32–36)
MCV RBC AUTO: 114 FL (ref 80–100)
NRBC BLD-RTO: 0 /100 WBCS (ref 0–0)
PLATELET # BLD AUTO: 203 X10*3/UL (ref 150–450)
POTASSIUM SERPL-SCNC: 4 MMOL/L (ref 3.5–5.3)
RBC # BLD AUTO: 2.06 X10*6/UL (ref 4–5.2)
SODIUM SERPL-SCNC: 134 MMOL/L (ref 136–145)
WBC # BLD AUTO: 9.3 X10*3/UL (ref 4.4–11.3)

## 2024-07-24 PROCEDURE — 2500000004 HC RX 250 GENERAL PHARMACY W/ HCPCS (ALT 636 FOR OP/ED): Performed by: INTERNAL MEDICINE

## 2024-07-24 PROCEDURE — 80048 BASIC METABOLIC PNL TOTAL CA: CPT | Performed by: INTERNAL MEDICINE

## 2024-07-24 PROCEDURE — 2500000005 HC RX 250 GENERAL PHARMACY W/O HCPCS: Performed by: INTERNAL MEDICINE

## 2024-07-24 PROCEDURE — 97535 SELF CARE MNGMENT TRAINING: CPT | Mod: GO

## 2024-07-24 PROCEDURE — 36415 COLL VENOUS BLD VENIPUNCTURE: CPT | Performed by: INTERNAL MEDICINE

## 2024-07-24 PROCEDURE — 97165 OT EVAL LOW COMPLEX 30 MIN: CPT | Mod: GO

## 2024-07-24 PROCEDURE — 99239 HOSP IP/OBS DSCHRG MGMT >30: CPT | Performed by: INTERNAL MEDICINE

## 2024-07-24 PROCEDURE — 85027 COMPLETE CBC AUTOMATED: CPT | Performed by: INTERNAL MEDICINE

## 2024-07-24 RX ORDER — AMOXICILLIN AND CLAVULANATE POTASSIUM 875; 125 MG/1; MG/1
1 TABLET, FILM COATED ORAL 2 TIMES DAILY
Start: 2024-07-24 | End: 2024-07-29

## 2024-07-24 RX ADMIN — LIDOCAINE 4% 1 PATCH: 40 PATCH TOPICAL at 10:06

## 2024-07-24 RX ADMIN — POLYETHYLENE GLYCOL 3350 17 G: 17 POWDER, FOR SOLUTION ORAL at 10:06

## 2024-07-24 RX ADMIN — CEFTRIAXONE 2 G: 2 INJECTION, POWDER, FOR SOLUTION INTRAMUSCULAR; INTRAVENOUS at 04:30

## 2024-07-24 ASSESSMENT — COGNITIVE AND FUNCTIONAL STATUS - GENERAL
EATING MEALS: A LITTLE
MOVING FROM LYING ON BACK TO SITTING ON SIDE OF FLAT BED WITH BEDRAILS: A LITTLE
HELP NEEDED FOR BATHING: A LITTLE
DRESSING REGULAR LOWER BODY CLOTHING: A LITTLE
MOBILITY SCORE: 17
DAILY ACTIVITIY SCORE: 18
STANDING UP FROM CHAIR USING ARMS: A LITTLE
WALKING IN HOSPITAL ROOM: A LITTLE
DAILY ACTIVITIY SCORE: 19
TURNING FROM BACK TO SIDE WHILE IN FLAT BAD: A LITTLE
TOILETING: A LITTLE
DRESSING REGULAR LOWER BODY CLOTHING: A LITTLE
MOVING TO AND FROM BED TO CHAIR: A LITTLE
CLIMB 3 TO 5 STEPS WITH RAILING: A LOT
DRESSING REGULAR UPPER BODY CLOTHING: A LITTLE
TOILETING: A LITTLE
HELP NEEDED FOR BATHING: A LITTLE
DRESSING REGULAR UPPER BODY CLOTHING: A LITTLE
PERSONAL GROOMING: A LITTLE
PERSONAL GROOMING: A LITTLE

## 2024-07-24 ASSESSMENT — PAIN SCALES - GENERAL
PAINLEVEL_OUTOF10: 0 - NO PAIN

## 2024-07-24 ASSESSMENT — ACTIVITIES OF DAILY LIVING (ADL)
ADL_ASSISTANCE: INDEPENDENT
BATHING_ASSISTANCE: MINIMAL
HOME_MANAGEMENT_TIME_ENTRY: 18

## 2024-07-24 ASSESSMENT — PAIN - FUNCTIONAL ASSESSMENT
PAIN_FUNCTIONAL_ASSESSMENT: 0-10

## 2024-07-24 NOTE — PROGRESS NOTES
07/24/24 0953   Discharge Planning   Type of Post Acute Facility Services Skilled nursing   Expected Discharge Disposition SNF  ([facility]: auth started 7/24)   Does the patient need discharge transport arranged? Yes   RoundTrip coordination needed? Yes   Has discharge transport been arranged? No     Met with patient at the bedside to discuss SNF.  Updated her on the SNFs that accepted her and she would like to go to [facility][1].  Requested [facility] team start auth  BARRIER: insurance auth  DISP: [facility]  ADOD: 1-3 days (need auth)  Patient is med ready.  Emily Bowden RN     7/24/24 @ 1420  Per [facility]aur[facility] team, patient has auth to admit thru 7/25.  Waiting to hear if facility can accept patient today.  Emily Bowden RN     7/24/24 @ 1444  [facility] can accept patient today.  Will have [facility] set up transport once discharge order in.  Nurse to call report to [phone]  Emily Bowden RN

## 2024-07-24 NOTE — CARE PLAN
The patient's goals for the shift include remaining comfortable     The clinical goals for the shift include maintain safety    Over the shift, the patient did not make progress toward the following goals. Barriers to progression include weakness. Recommendations to address these barriers include safety precautions.

## 2024-07-24 NOTE — PROGRESS NOTES
Occupational Therapy    Evaluation/Treatment    Patient Name: Kim Gillespie  MRN: [id number]  : 1950  Today's Date: 24  Time Calculation  Start Time: 1100  Stop Time: 1128  Time Calculation (min): 28 min       Assessment:  OT Assessment: pt presents with balance deficits and activity tolerance deficits. she presents below baseline in ADLs and fxnl mobility, requiring skilled OT services to maximize safety and independence. OT anticipating mod intensity therapy at d/c, or  with  assist due to fall risk.  Prognosis: Good  Barriers to Discharge: None  Evaluation/Treatment Tolerance: Patient tolerated treatment well  Medical Staff Made Aware: Yes  End of Session Communication: Bedside nurse  End of Session Patient Position: Up in chair, Alarm on  OT Assessment Results: Decreased ADL status, Decreased endurance, Decreased functional mobility  Prognosis: Good  Barriers to Discharge: None  Evaluation/Treatment Tolerance: Patient tolerated treatment well  Medical Staff Made Aware: Yes  Strengths: Support of Caregivers, Housing layout  Barriers to Participation: Comorbidities  Plan:  Treatment Interventions: ADL retraining, Functional transfer training, Endurance training, Cognitive reorientation, Patient/family training, Equipment evaluation/education, Neuromuscular reeducation  OT Frequency: 2 times per week  OT Discharge Recommendations: Moderate intensity level of continued care  Equipment Recommended upon Discharge: Wheeled walker  OT - OK to Discharge: Yes  Treatment Interventions: ADL retraining, Functional transfer training, Endurance training, Cognitive reorientation, Patient/family training, Equipment evaluation/education, Neuromuscular reeducation    Subjective   Current Problem:  1. Mass in chest  CT guided percutaneous biopsy mediastinum    CANCELED: CT guided percutaneous biopsy mediastinum        General:   OT Received On: 24  General  Reason for Referral: To ED s/p fall at [facility] with AMS.  Imaging found a tranverse fx at L1, L2-3 compression fx, and C6-7 spinous process fx.  Referred By: Ya Funez MD  Past Medical History Relevant to Rehab: History reviewed. No pertinent past medical history.    Missed Visit: No  Missed Visit Reason: Patient in a medical procedure (2nd attempt, pt still in MRI.)  Prior to Session Communication: Bedside nurse  Patient Position Received: Bed, 3 rail up, Alarm on  General Comment: agreeable to OT eval, states she will dc to rehab for PT only. requesting to use bathroom  Precautions:  Hearing/Visual Limitations: hard of hearing  Medical Precautions: Fall precautions, Spinal precautions  Vital Signs:     Pain:  Pain Assessment  0-10 (Numeric) Pain Score: 0 - No pain    Objective   Home Living:  Type of Home: Assisted living  Lives With: Alone  Home Adaptive Equipment: Cane, Walker rolling or standard  Home Layout: One level  Home Access: Level entry  Bathroom Shower/Tub: Walk-in shower  Bathroom Toilet: Standard  Bathroom Equipment: Grab bars in shower, Built-in shower seat, Grab bars around toilet  Prior Function:  Level of [place]: Independent with ADLs and functional transfers  Receives Help From: Personal care attendant  ADL Assistance: Independent  Homemaking Assistance:  (group home handles iADLs)  Ambulatory Assistance: Independent (cane)  Prior Function Comments: Does not drive. Denies any additional falls besides fall PTA.  IADL History:  Current License: No  ADL:  Eating Assistance: Independent  Grooming Assistance: Stand by  Grooming Deficit: Setup  Bathing Assistance: Minimal  Bathing Deficit: Right lower leg including foot, Left lower leg including foot  UE Dressing Assistance: Stand by  UE Dressing Deficit: Setup, Supervision/safety  LE Dressing Assistance: Minimal  LE Dressing Deficit: Thread RLE into underwear, Thread LLE into underwear, Thread RLE into pants, Thread LLE into pants, Don/doff L shoe, Don/doff R shoe  Toileting Assistance with Device:  Stand by  Toileting Deficit: Increased time to complete, Supervison/safety, Steadying  Activities of Daily Living: Toileting  Toileting Level of Assistance: Close supervision  Where Assessed: Toilet  Toileting Comments: significantly increased time, pt able to throughly complete rosaura care and manage clothing with close supervision/ SBA  Activity Tolerance:  Endurance: Tolerates 30 min exercise with multiple rests  Functional Standing Tolerance:     Bed Mobility/Transfers: Bed Mobility 1  Bed Mobility 1: Supine to sitting  Level of Assistance 1: Close supervision  Bed Mobility Comments 1: increased time, states she is slow moving at baseline. HOB elevated.    Transfers  Transfer: Yes  Transfer 1  Transfer From 1: Bed to  Transfer to 1: Stand  Technique 1: Sit to stand  Transfer Device 1: Walker  Transfer Level of Assistance 1: Close supervision  Transfers 2  Transfer From 2: Toilet to  Transfer to 2: Chair with arms  Technique 2: Stand pivot  Transfer Device 2: Walker  Transfer Level of Assistance 2: Close supervision      Functional Mobility:  Functional Mobility  Functional Mobility Performed: Yes  Functional Mobility 1  Surface 1: Level tile  Device 1: Rolling walker  Functional Mobility Support Devices: Gait belt  Assistance 1: Close supervision  Comments 1: assist with IV pole, pt very slow moving  Sensation:  Light Touch: No apparent deficits  Sharp/Dull: No apparent deficits  Stereognosis: No apparent deficits  Proprioception: No apparent deficits  Coordination:  Movements are Fluid and Coordinated: Yes   Hand Function:  Hand Function  Gross Grasp: Functional  Coordination: Functional  Extremities: RUE   RUE : Within Functional Limits and LUE   LUE: Within Functional Limits    Outcome Measures: [facility] Daily Activity  Putting on and taking off regular lower body clothing: A little  Bathing (including washing, rinsing, drying): A little  Putting on and taking off regular upper body clothing: A little  Toileting,  which includes using toilet, bedpan or urinal: A little  Taking care of personal grooming such as brushing teeth: A little  Eating Meals: A little  Daily Activity - Total Score: 18    Education Documentation  Body Mechanics, taught by Melissa Keller OT at 7/24/2024  1:13 PM.  Learner: Patient  Readiness: Acceptance  Method: Explanation  Response: Verbalizes Understanding    Precautions, taught by Melissa Keller OT at 7/24/2024  1:13 PM.  Learner: Patient  Readiness: Acceptance  Method: Explanation  Response: Verbalizes Understanding    ADL Training, taught by Melissa Keller OT at 7/24/2024  1:13 PM.  Learner: Patient  Readiness: Acceptance  Method: Explanation  Response: Verbalizes Understanding    Education Comments  No comments found.      Goals:  Encounter Problems       Encounter Problems (Active)       ADLs       Patient with complete lower body dressing with supervision level of assistance donning and doffing all LE clothes  with PRN adaptive equipment while supported sitting       Start:  07/24/24    Expected End:  08/07/24            Patient will complete toileting including hygiene clothing management/hygiene with modified independent level of assistance and grab bars.       Start:  07/24/24    Expected End:  08/07/24               MOBILITY       Patient will perform Functional mobility mod  Household distances/Community Distances with supervision level of assistance and least restrictive device in order to improve safety and functional mobility.       Start:  07/24/24    Expected End:  08/07/24               TRANSFERS       Patient will perform bed mobility modified independent level of assistance and bed rails in order to improve safety and independence with mobility       Start:  07/24/24    Expected End:  08/07/24            Patient will complete functional transfer to all surfaces with least restrictive device with supervision level of assistance.       Start:  07/24/24    Expected End:  08/07/24

## 2024-07-24 NOTE — DISCHARGE INSTRUCTIONS
You are scheduled for a mediastinal mass Biopsy on 8/2 @ 11am. You will need to be here 1 hour early (10 am) for sedation preparation. The procedure will take roughly 1 hour, under moderate sedation. You can expect to be monitored for 3-4 hours post procedure with a plan t discharge you back to the facility after this. Please contact Interventional radiology with any questions at [phone].    You are scheduled for a PET scan at [facility] ( [place]) 1200 on 8/13 @ 8am. This scan will take approximately 2 hours. You should limit yourself to a low carb diet the day before the scan (meat, fish, cheese, vegetables, and eggs). Do not eat or drink after midnight the night before the scan. Please be sure to drink plenty of water the day before the scan and limit caffeine. Call Thoracic Surgery at [phone] with any questions or concerns. Appt to follow PET scan.

## 2024-07-24 NOTE — TREATMENT PLAN
Plan for outpatient PET scan, biopsy (8/2 pending precert), and follow-up with Dr. Lerner in 3 weeks.    Thoracic surgery will sign off. Thank you for this consult, please reach out with questions concerns or change in status.    Ivana Gill PA-C

## 2024-07-24 NOTE — DISCHARGE SUMMARY
Discharge Diagnosis  Mass in chest    Issues Requiring Follow-Up  R Lung mass biopsy     Discharge Meds     Your medication list        START taking these medications        Instructions Last Dose Given Next Dose Due   amoxicillin-pot clavulanate 875-125 mg tablet  Commonly known as: Augmentin      Take 1 tablet by mouth 2 times a day for 5 days.              CONTINUE taking these medications        Instructions Last Dose Given Next Dose Due   acetaminophen 650 mg ER tablet  Commonly known as: Tylenol 8 HOUR           beclomethasone HFA 40 mcg/actuation inhaler  Commonly known as: Qvar           calcium carbonate 500 mg calcium (1,250 mg) tablet  Commonly known as: Oscal           cholecalciferol 50 MCG (2000 UT) tablet  Commonly known as: Vitamin D-3           fluticasone 50 mcg/actuation nasal spray  Commonly known as: Flonase           loratadine 10 mg tablet  Commonly known as: Claritin           lubricating eye drops ophthalmic solution           montelukast 10 mg tablet  Commonly known as: Singulair           multivitamin with minerals iron-free  Commonly known as: Centrum Silver           omeprazole 20 mg DR capsule  Commonly known as: PriLOSEC           predniSONE 10 mg tablet  Commonly known as: Deltasone           PreserVision AREDS-2 250-90-40-1 mg capsule  Generic drug: vit C,V-Rn-jthdu-lutein-zeaxan                     Where to Get Your Medications        Information about where to get these medications is not yet available    Ask your nurse or doctor about these medications  amoxicillin-pot clavulanate 875-125 mg tablet         Test Results Pending At Discharge  Pending Labs       Order Current Status    Respiratory Culture/Smear Collected (07/21/24 1122)            Procedures       Hospital Course   Kim was admitted to hospital with a right sided lung mass CT surgery as well as IR were consulted.  It was recommended for IR guided biopsy.  This will be scheduled in the next 2 weeks.  She is also  "treated for pneumonia and will need to finish an oral course.  At this time she is otherwise hemodynamic stable and appropriate for discharge.  She was evaluated by PT and OT and going to skilled nursing facility.   This plan was discussed with her and she is in agreement.    All questions were answered.    Blood pressure 148/88, pulse 91, temperature 36.1 °C (96.9 °F), temperature source Temporal, resp. rate 18, height 1.219 m (3' 11.99\"), weight (!) 31.8 kg (70 lb), SpO2 98%.  Pertinent Physical Exam At Time of Discharge  Physical Exam  Vitals reviewed.   Constitutional:       Appearance: Normal appearance.   HENT:      Head: Normocephalic.      Nose: Nose normal.      Mouth/Throat:      Mouth: Mucous membranes are moist.   Pulmonary:      Effort: Pulmonary effort is normal.   Abdominal:      General: Abdomen is flat. Bowel sounds are normal.      Palpations: Abdomen is soft.   Skin:     General: Skin is warm.      Capillary Refill: Capillary refill takes less than 2 seconds.   Neurological:      General: No focal deficit present.      Mental Status: She is alert.         Outpatient Follow-Up  Future Appointments   Date Time Provider Department Center   8/2/2024 11:00 AM AHU CT 2 [facility] U Rusty Chavez DO FACDANIS     Time spent during this discharge: >30 min          "

## 2024-07-24 NOTE — PROGRESS NOTES
"Kim Gillespie is a 74 y.o. female on day 3 of admission presenting with Mass in chest.    Subjective   No acute events overnight. Lying in bed. Discussed the plan for biopsy in about 2 weeks. Will work on placement for rehab until biopsy can be done.        Objective     VITALS  Blood pressure 148/88, pulse 91, temperature 36.1 °C (96.9 °F), temperature source Temporal, resp. rate 18, height 1.219 m (3' 11.99\"), weight (!) 31.8 kg (70 lb), SpO2 98%.  Physical Exam  Vitals reviewed.   Constitutional:       Appearance: Normal appearance.   HENT:      Head: Normocephalic.      Nose: Nose normal.      Mouth/Throat:      Mouth: Mucous membranes are moist.   Pulmonary:      Effort: Pulmonary effort is normal.   Abdominal:      General: Abdomen is flat. Bowel sounds are normal.      Palpations: Abdomen is soft.   Skin:     General: Skin is warm.      Capillary Refill: Capillary refill takes less than 2 seconds.   Neurological:      General: No focal deficit present.      Mental Status: She is alert.           Intake/Output last 3 Shifts:  I/O last 3 completed shifts:  In: 160 (5 mL/kg) [P.O.:60; IV Piggyback:100]  Out: - (0 mL/kg)   Weight: 31.8 kg     Relevant Results  Results for orders placed or performed during the hospital encounter of 07/21/24 (from the past 24 hour(s))   CBC   Result Value Ref Range    WBC 9.3 4.4 - 11.3 x10*3/uL    nRBC 0.0 0.0 - 0.0 /100 WBCs    RBC 2.06 (L) 4.00 - 5.20 x10*6/uL    Hemoglobin 7.4 (L) 12.0 - 16.0 g/dL    Hematocrit 23.4 (L) 36.0 - 46.0 %     (H) 80 - 100 fL    MCH 35.9 (H) 26.0 - 34.0 pg    MCHC 31.6 (L) 32.0 - 36.0 g/dL    RDW 14.5 11.5 - 14.5 %    Platelets 203 150 - 450 x10*3/uL   Basic metabolic panel   Result Value Ref Range    Glucose 84 74 - 99 mg/dL    Sodium 134 (L) 136 - 145 mmol/L    Potassium 4.0 3.5 - 5.3 mmol/L    Chloride 101 98 - 107 mmol/L    Bicarbonate 22 21 - 32 mmol/L    Anion Gap 15 10 - 20 mmol/L    Urea Nitrogen 16 6 - 23 mg/dL    Creatinine 0.47 (L) " 0.50 - 1.05 mg/dL    eGFR >90 >60 mL/min/1.73m*2    Calcium 7.2 (L) 8.6 - 10.3 mg/dL       Imaging Results  MR chest w and wo IV contrast   Preliminary Result   1. Large heterogeneously enhancing predominantly fat containing mass   (with soft tissue components) centered within the posterior right   hemithorax with a contiguous posterior mediastinal component. There   is an enhancing soft tissue component of the mass extending from the   inferolateral border of the mass and into the pleural space. The mass   demonstrates patchy areas of increased STIR signal with associated   diffusion restriction. There is adjacent atelectasis of the right   lung parenchyma surrounding the mass. 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 Recommend further evaluation with direct tissue   sampling.   2. Large right-sided pleural effusion, worsened compared to prior   07/19/2024 exam. Interval development of moderate-sized left pleural   effusion.   3. Bilateral fat containing Bochdalek hernias.   4. Increased STIR signal involving the posterolateral aspects of the   right 8th through 10th ribs, consistent with acute fractures, which   can be correlated to CT 07/19/2024. Other findings, as above.        I personally reviewed the images/study and I agree with the findings   as stated by Hebert Gore MD. This study was interpreted at   [facility], [place], [place]             Dictation workstation:   [id number]      CT guided percutaneous biopsy mediastinum    (Results Pending)       Medications:  budesonide, 0.5 mg, nebulization, BID  cefTRIAXone, 2 g, intravenous, q24h  enoxaparin, 40 mg, subcutaneous, q24h  formoterol, 20 mcg, nebulization, BID  lidocaine, 1 patch, transdermal, Daily  polyethylene glycol, 17 g, oral, Daily  predniSONE, 15 mg, oral, q48h       PRN medications: acetaminophen **OR** acetaminophen **OR** acetaminophen, ipratropium-albuteroL, ondansetron **OR** ondansetron, oxygen         Assessment/Plan   Principal Problem:    Mass in chest    Right hemithorax mass  -need biopsy will be done OP with IR currently scheduled for 8/2/24 per IR note   -seen by ct surgery pending mri chest, needs PET as OP also has sclerotic lesion at right iliac bone     2.  Bilateral upper lobe pneumonia  -Continue ceftriaxone azithromycin     3.  Mildly displaced L1 right transverse process fracture with compression fracture at the inferior endplate of L1 and superior endplate of L3 of indeterminate age  -Pain control, PT OT     4.  Hemolytic anemia  -On prednisone therapy 15 mg oral every 48 hours  -hgb 7.1, check anemia work up as well     5.  Asthma  -Current inhalers    DVT Prophylaxis:  Lovenox subq    Disposition:  Awaiting placement.     Juan Chavez, [facility]

## 2024-07-24 NOTE — CARE PLAN
MATTHEW  called  facility  and spoke to    Dahlia Watson- [phone]    who reported that there  is a  bed available  at the  facility and pt  can come today. Due to  [facility]IN  issues  all dc info needs to be emailed to  [email or web address]    [facility]  told    PINA Givens, [place]      7-24-24   1616  5pm transport  time  MATTHEW  called sister Freya  and left a  voicemail  with info  PINA Givens, [place]

## 2024-07-25 LAB
ATRIAL RATE: 103 BPM
BACTERIA BLD CULT: NORMAL
BACTERIA BLD CULT: NORMAL
P AXIS: 100 DEGREES
PR INTERVAL: 83 MS
Q ONSET: 253 MS
QRS COUNT: 17 BEATS
QRS DURATION: 94 MS
QT INTERVAL: 347 MS
QTC CALCULATION(BAZETT): 454 MS
QTC FREDERICIA: 415 MS
R AXIS: 38 DEGREES
T AXIS: -8 DEGREES
T OFFSET: 426 MS
VENTRICULAR RATE: 103 BPM

## 2024-07-25 NOTE — DOCUMENTATION CLARIFICATION NOTE
"    PATIENT:               ISABEL TOMPKINS  ACCT #:                  [id number]  MRN:                       [id number]  :                       1950  ADMIT DATE:       2024 10:01 AM  DISCH DATE:        2024 8:19 PM  RESPONDING PROVIDER #:        [id number]          PROVIDER RESPONSE TEXT:    L1, T12 compression fracture are considered non-traumatic with acute traumatic 8th-10th rib fractures    CDI QUERY TEXT:    Clarification        Instruction:    Based on your assessment of the patient and the clinical information, please provide the requested documentation by clicking on the appropriate radio button and enter any additional information if prompted.    Question: Please further clarify specify if able fracture site and type as    When answering this query, please exercise your independent professional judgment. The fact that a question is being asked, does not imply that any particular answer is desired or expected.    The patient's clinical indicators include:  Clinical Information: Admitted for work-up of right hemithorax/mediastinal mass    Clinical Indicators:    -24 MR of chest: \"Moderate compression deformity involving the superior endplate of T12. There is a moderate compression deformity involving the L1 vertebral body with approximately 0.4 cm of retropulsion. Increased STIR signal involving the  posterolateral aspects of the right 8th through 10th ribs, consistent with acute fractures, which can be correlated to CT  2024\"    -History and physical note-assessment: \"Mildly displaced L1 right transverse process fracture with compression fracture at the inferior endplate of L1 and superior endplate of L3 of indeterminate age\"    -24 CT consult note by NP: Past Medical History-\"GUNNAR, GERD, hypoplastic anemia, Danielson esophagus, melanoma of left arm, frequent falls, past CT guided biopsy positive for myelolipoma as well as right lung wedge resection in 2018, no malignant cells at that " "time.\"    Treatment: assessed/evaluated    Risk Factors: advanced age  Options provided:  -- L1, T12 compression fracture are considered traumatic with acute traumatic 8th-10th rib fractures, Please specify additional information below  -- L1, T12 compression fracture are considered non-traumatic with acute traumatic 8th-10th rib fractures  -- L1, T12 compression fracture are considered non-traumatic with acute non-traumatic 8th-10th rib fractures  -- Other - I will add my own diagnosis  -- Refer to Clinical Documentation Reviewer    Query created by: India Khan on 7/24/2024 4:59 PM      Electronically signed by:  JUDSON LONDON DO 7/25/2024 1:57 PM          "

## 2024-07-26 ENCOUNTER — LAB REQUISITION (OUTPATIENT)
Dept: LAB | Facility: HOSPITAL | Age: 74
End: 2024-07-26
Payer: COMMERCIAL

## 2024-07-26 DIAGNOSIS — J96.10 CHRONIC RESPIRATORY FAILURE, UNSPECIFIED WHETHER WITH HYPOXIA OR HYPERCAPNIA (MULTI): ICD-10-CM

## 2024-07-26 DIAGNOSIS — I10 ESSENTIAL (PRIMARY) HYPERTENSION: ICD-10-CM

## 2024-07-26 LAB
ALBUMIN SERPL BCP-MCNC: 2.9 G/DL (ref 3.4–5)
ALP SERPL-CCNC: 147 U/L (ref 33–136)
ALT SERPL W P-5'-P-CCNC: 23 U/L (ref 7–45)
ANION GAP SERPL CALC-SCNC: 13 MMOL/L (ref 10–20)
AST SERPL W P-5'-P-CCNC: 15 U/L (ref 9–39)
BASOPHILS # BLD AUTO: 0.02 X10*3/UL (ref 0–0.1)
BASOPHILS NFR BLD AUTO: 0.2 %
BILIRUB SERPL-MCNC: 0.6 MG/DL (ref 0–1.2)
BUN SERPL-MCNC: 18 MG/DL (ref 6–23)
CALCIUM SERPL-MCNC: 7.9 MG/DL (ref 8.6–10.3)
CHLORIDE SERPL-SCNC: 101 MMOL/L (ref 98–107)
CO2 SERPL-SCNC: 23 MMOL/L (ref 21–32)
CREAT SERPL-MCNC: 0.58 MG/DL (ref 0.5–1.05)
EGFRCR SERPLBLD CKD-EPI 2021: >90 ML/MIN/1.73M*2
EOSINOPHIL # BLD AUTO: 0.03 X10*3/UL (ref 0–0.4)
EOSINOPHIL NFR BLD AUTO: 0.3 %
ERYTHROCYTE [DISTWIDTH] IN BLOOD BY AUTOMATED COUNT: 15 % (ref 11.5–14.5)
GLUCOSE SERPL-MCNC: 116 MG/DL (ref 74–99)
HCT VFR BLD AUTO: 22.1 % (ref 36–46)
HGB BLD-MCNC: 7.5 G/DL (ref 12–16)
IMM GRANULOCYTES # BLD AUTO: 0.15 X10*3/UL (ref 0–0.5)
IMM GRANULOCYTES NFR BLD AUTO: 1.6 % (ref 0–0.9)
LYMPHOCYTES # BLD AUTO: 0.34 X10*3/UL (ref 0.8–3)
LYMPHOCYTES NFR BLD AUTO: 3.6 %
MCH RBC QN AUTO: 38.3 PG (ref 26–34)
MCHC RBC AUTO-ENTMCNC: 33.9 G/DL (ref 32–36)
MCV RBC AUTO: 113 FL (ref 80–100)
MONOCYTES # BLD AUTO: 0.95 X10*3/UL (ref 0.05–0.8)
MONOCYTES NFR BLD AUTO: 10.1 %
NEUTROPHILS # BLD AUTO: 7.88 X10*3/UL (ref 1.6–5.5)
NEUTROPHILS NFR BLD AUTO: 84.2 %
NRBC BLD-RTO: 0 /100 WBCS (ref 0–0)
PLATELET # BLD AUTO: 172 X10*3/UL (ref 150–450)
POTASSIUM SERPL-SCNC: 3.6 MMOL/L (ref 3.5–5.3)
PROT SERPL-MCNC: 5.3 G/DL (ref 6.4–8.2)
RBC # BLD AUTO: 1.96 X10*6/UL (ref 4–5.2)
SODIUM SERPL-SCNC: 133 MMOL/L (ref 136–145)
WBC # BLD AUTO: 9.4 X10*3/UL (ref 4.4–11.3)

## 2024-07-26 PROCEDURE — 85025 COMPLETE CBC W/AUTO DIFF WBC: CPT | Mod: OUT | Performed by: INTERNAL MEDICINE

## 2024-07-26 PROCEDURE — 85060 BLOOD SMEAR INTERPRETATION: CPT | Performed by: INTERNAL MEDICINE

## 2024-07-26 PROCEDURE — 84075 ASSAY ALKALINE PHOSPHATASE: CPT | Mod: OUT | Performed by: INTERNAL MEDICINE

## 2024-07-29 LAB — PATH REVIEW-CBC DIFFERENTIAL: NORMAL

## 2024-07-31 ENCOUNTER — HOSPITAL ENCOUNTER (EMERGENCY)
Facility: HOSPITAL | Age: 74
Discharge: HOME | End: 2024-07-31
Attending: EMERGENCY MEDICINE
Payer: COMMERCIAL

## 2024-07-31 VITALS
DIASTOLIC BLOOD PRESSURE: 67 MMHG | SYSTOLIC BLOOD PRESSURE: 133 MMHG | HEIGHT: 55 IN | BODY MASS INDEX: 15.73 KG/M2 | HEART RATE: 80 BPM | RESPIRATION RATE: 28 BRPM | WEIGHT: 68 LBS | TEMPERATURE: 97.6 F | OXYGEN SATURATION: 100 %

## 2024-07-31 DIAGNOSIS — D64.9 ANEMIA, UNSPECIFIED TYPE: Primary | ICD-10-CM

## 2024-07-31 LAB
ABO GROUP (TYPE) IN BLOOD: NORMAL
ALBUMIN SERPL BCP-MCNC: 2.9 G/DL (ref 3.4–5)
ALP SERPL-CCNC: 199 U/L (ref 33–136)
ALT SERPL W P-5'-P-CCNC: 14 U/L (ref 7–45)
ANION GAP SERPL CALC-SCNC: 16 MMOL/L (ref 10–20)
ANTIBODY SCREEN: NORMAL
APTT PPP: 31 SECONDS (ref 27–38)
AST SERPL W P-5'-P-CCNC: 10 U/L (ref 9–39)
BASOPHILS # BLD AUTO: 0.01 X10*3/UL (ref 0–0.1)
BASOPHILS NFR BLD AUTO: 0.2 %
BILIRUB SERPL-MCNC: 0.6 MG/DL (ref 0–1.2)
BUN SERPL-MCNC: 14 MG/DL (ref 6–23)
CALCIUM SERPL-MCNC: 7.9 MG/DL (ref 8.6–10.3)
CHLORIDE SERPL-SCNC: 99 MMOL/L (ref 98–107)
CO2 SERPL-SCNC: 20 MMOL/L (ref 21–32)
CREAT SERPL-MCNC: 0.44 MG/DL (ref 0.5–1.05)
EGFRCR SERPLBLD CKD-EPI 2021: >90 ML/MIN/1.73M*2
EOSINOPHIL # BLD AUTO: 0 X10*3/UL (ref 0–0.4)
EOSINOPHIL NFR BLD AUTO: 0 %
ERYTHROCYTE [DISTWIDTH] IN BLOOD BY AUTOMATED COUNT: 15.3 % (ref 11.5–14.5)
GLUCOSE SERPL-MCNC: 126 MG/DL (ref 74–99)
HCT VFR BLD AUTO: 23 % (ref 36–46)
HGB BLD-MCNC: 7.8 G/DL (ref 12–16)
IMM GRANULOCYTES # BLD AUTO: 0.05 X10*3/UL (ref 0–0.5)
IMM GRANULOCYTES NFR BLD AUTO: 0.9 % (ref 0–0.9)
INR PPP: 1.4 (ref 0.9–1.1)
LYMPHOCYTES # BLD AUTO: 0.14 X10*3/UL (ref 0.8–3)
LYMPHOCYTES NFR BLD AUTO: 2.4 %
MCH RBC QN AUTO: 37.1 PG (ref 26–34)
MCHC RBC AUTO-ENTMCNC: 33.9 G/DL (ref 32–36)
MCV RBC AUTO: 110 FL (ref 80–100)
MONOCYTES # BLD AUTO: 0.21 X10*3/UL (ref 0.05–0.8)
MONOCYTES NFR BLD AUTO: 3.7 %
NEUTROPHILS # BLD AUTO: 5.33 X10*3/UL (ref 1.6–5.5)
NEUTROPHILS NFR BLD AUTO: 92.8 %
NRBC BLD-RTO: 0 /100 WBCS (ref 0–0)
PLATELET # BLD AUTO: 240 X10*3/UL (ref 150–450)
POTASSIUM SERPL-SCNC: 3.9 MMOL/L (ref 3.5–5.3)
PROT SERPL-MCNC: 5.7 G/DL (ref 6.4–8.2)
PROTHROMBIN TIME: 15.8 SECONDS (ref 9.8–12.8)
RBC # BLD AUTO: 2.1 X10*6/UL (ref 4–5.2)
RH FACTOR (ANTIGEN D): NORMAL
SODIUM SERPL-SCNC: 131 MMOL/L (ref 136–145)
WBC # BLD AUTO: 5.7 X10*3/UL (ref 4.4–11.3)

## 2024-07-31 PROCEDURE — 85025 COMPLETE CBC W/AUTO DIFF WBC: CPT | Performed by: EMERGENCY MEDICINE

## 2024-07-31 PROCEDURE — 84075 ASSAY ALKALINE PHOSPHATASE: CPT | Performed by: EMERGENCY MEDICINE

## 2024-07-31 PROCEDURE — 85610 PROTHROMBIN TIME: CPT | Performed by: EMERGENCY MEDICINE

## 2024-07-31 PROCEDURE — 36415 COLL VENOUS BLD VENIPUNCTURE: CPT | Performed by: EMERGENCY MEDICINE

## 2024-07-31 PROCEDURE — 86901 BLOOD TYPING SEROLOGIC RH(D): CPT | Performed by: EMERGENCY MEDICINE

## 2024-07-31 PROCEDURE — 85730 THROMBOPLASTIN TIME PARTIAL: CPT | Performed by: EMERGENCY MEDICINE

## 2024-07-31 PROCEDURE — 99283 EMERGENCY DEPT VISIT LOW MDM: CPT | Performed by: EMERGENCY MEDICINE

## 2024-07-31 ASSESSMENT — PAIN - FUNCTIONAL ASSESSMENT: PAIN_FUNCTIONAL_ASSESSMENT: 0-10

## 2024-07-31 ASSESSMENT — COLUMBIA-SUICIDE SEVERITY RATING SCALE - C-SSRS
1. IN THE PAST MONTH, HAVE YOU WISHED YOU WERE DEAD OR WISHED YOU COULD GO TO SLEEP AND NOT WAKE UP?: NO
6. HAVE YOU EVER DONE ANYTHING, STARTED TO DO ANYTHING, OR PREPARED TO DO ANYTHING TO END YOUR LIFE?: NO
2. HAVE YOU ACTUALLY HAD ANY THOUGHTS OF KILLING YOURSELF?: NO

## 2024-07-31 ASSESSMENT — LIFESTYLE VARIABLES
HAVE PEOPLE ANNOYED YOU BY CRITICIZING YOUR DRINKING: NO
EVER FELT BAD OR GUILTY ABOUT YOUR DRINKING: NO
HAVE YOU EVER FELT YOU SHOULD CUT DOWN ON YOUR DRINKING: NO
TOTAL SCORE: 0
EVER HAD A DRINK FIRST THING IN THE MORNING TO STEADY YOUR NERVES TO GET RID OF A HANGOVER: NO

## 2024-07-31 ASSESSMENT — PAIN SCALES - GENERAL
PAINLEVEL_OUTOF10: 0 - NO PAIN
PAINLEVEL_OUTOF10: 0 - NO PAIN

## 2024-07-31 NOTE — ED PROVIDER NOTES
HPI   Chief Complaint   Patient presents with    low hemoglobin     Pt coming from nursing home saying that her labs they palma said she  has a hemoglobin of 6.1       Chief complaint: Low hemoglobin    History of present illness: Patient is a 74-year-old female with history of aplastic anemia presenting to the emergency department with complaints of a low hemoglobin.  According to the patient, she had blood work performed today which demonstrated a low hemoglobin.  The patient states that she otherwise feels fine and is denying any pain at this time.  Concern, the patient presents to the emergency department for further evaluation.  According to the patient's records, the patient does not take any blood thinners.      History provided by:  Patient   used: No            Patient History   No past medical history on file.  No past surgical history on file.  No family history on file.  Social History     Tobacco Use    Smoking status: Never    Smokeless tobacco: Never   Substance Use Topics    Alcohol use: Not on file    Drug use: Not on file       Physical Exam   ED Triage Vitals [07/31/24 1808]   Temperature Heart Rate Respirations BP   36.4 °C (97.6 °F) 90 16 124/78      Pulse Ox Temp Source Heart Rate Source Patient Position   98 % Temporal Monitor --      BP Location FiO2 (%)     -- 28 %       Physical Exam  Constitutional:       Appearance: Normal appearance.   HENT:      Head: Normocephalic and atraumatic.      Right Ear: External ear normal.      Left Ear: External ear normal.      Nose: Nose normal.      Mouth/Throat:      Mouth: Mucous membranes are moist.   Eyes:      General: Lids are normal.      Extraocular Movements: Extraocular movements intact.      Pupils: Pupils are equal, round, and reactive to light.   Cardiovascular:      Rate and Rhythm: Normal rate and regular rhythm.      Heart sounds: Normal heart sounds.   Pulmonary:      Effort: Pulmonary effort is normal.      Breath  sounds: Normal breath sounds.   Abdominal:      General: Abdomen is flat.      Palpations: Abdomen is soft.      Tenderness: There is no abdominal tenderness. There is no guarding or rebound.   Musculoskeletal:         General: No deformity. Normal range of motion.      Cervical back: Normal range of motion and neck supple.   Skin:     General: Skin is warm.      Capillary Refill: Capillary refill takes less than 2 seconds.      Coloration: Skin is pale. Skin is not jaundiced.   Neurological:      General: No focal deficit present.      Mental Status: She is alert and oriented to person, place, and time.   Psychiatric:         Mood and Affect: Mood normal.         Behavior: Behavior normal.           ED Course & MDM   Diagnoses as of 07/31/24 1940   Anemia, unspecified type                       Dell Coma Scale Score: 15                        Medical Decision Making  Medical decision making: Patient remained stable throughout her time in the emergency department.  CBC demonstrated a hemoglobin of 7.8.  Patient's INR was 1.4 PTT was 31 Chem-7 demonstrated essentially no significant abnormalities LFTs were essentially within normal limits.  Blood type is a positive.    Patient presents to the emergency department with complaints of an abnormal laboratory value.  Workup was performed as above and demonstrated the patient's hemoglobin is actually 7.8.  At this time, there is no indication for transfusion as the patient's hemoglobin is acceptable.  Patient was reassured.  The patient was then transferred back to her living facility in otherwise stable condition.    Amount and/or Complexity of Data Reviewed  Labs: ordered. Decision-making details documented in ED Course.        Procedure  Procedures     Constantino Zuluaga MD  08/01/24 0045

## 2024-08-02 ENCOUNTER — APPOINTMENT (OUTPATIENT)
Dept: RADIOLOGY | Facility: HOSPITAL | Age: 74
End: 2024-08-02
Payer: COMMERCIAL

## 2024-08-13 ENCOUNTER — APPOINTMENT (OUTPATIENT)
Dept: RADIOLOGY | Facility: CLINIC | Age: 74
End: 2024-08-13
Payer: COMMERCIAL

## 2024-08-22 ENCOUNTER — HOSPITAL ENCOUNTER (OUTPATIENT)
Dept: RADIOLOGY | Facility: CLINIC | Age: 74
Discharge: HOME | End: 2024-08-22
Payer: COMMERCIAL

## 2024-08-22 DIAGNOSIS — R22.2 MASS IN CHEST: ICD-10-CM

## 2024-08-22 DIAGNOSIS — D38.3 NEOPLASM OF UNCERTAIN BEHAVIOR OF MEDIASTINUM: ICD-10-CM

## 2024-08-22 PROCEDURE — 3430000001 HC RX 343 DIAGNOSTIC RADIOPHARMACEUTICALS: Performed by: STUDENT IN AN ORGANIZED HEALTH CARE EDUCATION/TRAINING PROGRAM

## 2024-08-22 PROCEDURE — A9552 F18 FDG: HCPCS | Performed by: STUDENT IN AN ORGANIZED HEALTH CARE EDUCATION/TRAINING PROGRAM

## 2024-08-22 PROCEDURE — 78815 PET IMAGE W/CT SKULL-THIGH: CPT | Mod: PI

## 2024-08-22 RX ORDER — FLUDEOXYGLUCOSE F 18 200 MCI/ML
11 INJECTION, SOLUTION INTRAVENOUS
Status: COMPLETED | OUTPATIENT
Start: 2024-08-22 | End: 2024-08-22

## 2024-08-27 PROBLEM — I48.91 A-FIB (MULTI): Status: ACTIVE | Noted: 2024-01-01

## 2024-08-27 NOTE — H&P
[facility] - GENERAL MEDICINE HISTORY AND PHYSICAL    History Obtained From: Chart Review, Discussion with the ED Physician     History Of Present Illness:  Kim Gillespie is a 74 y.o.  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  Patient's weight was 74 pounds as documented on 07/21/2024 and is currently at 61 pounds.  Per ED nursing staff documentation the patient was alert and oriented x 3 but upon discussion with the ED physician she was maybe alert and oriented x 2 and provided very basic information such as her name or date of birth and knew that she was in a hospital but did not know where or why she was in the hospital.  Patient was unable to provide any meaningful information about her presentation or histories.  Per discussion with the ED physician he had mentioned that she was notably encephalopathic and he did not feel comfortable with her making her own decisions.  There was extensive discussion between the ED physician and the patient's sister.  EMS had reported that the patient is a DNR and paperwork was received from the facility.  In brief the patient had presented to the ED due to what appeared to be atrial fibrillation with rapid ventricular response with rates in the 130s she was also noted to be hypoglycemic requiring D10 prior to arrival to the hospital.  On arrival the patient was noted to be in atrial fibrillation with rapid ventricular response.  It was noted by the facility and ED documentation that the patient has become increasingly jaundiced as well.  CBC was notable for a WBC  of 7.5, hemoglobin/hematocrit 8.5/26.2, platelet count of 174 with repeat labs noting a white blood cell count of 11.2, hemoglobin of 6.6, platelet count of 223 which was initially thought to be due to sepsis versus delusional effects as well but was ordered 1 unit of packed red blood cells.  She was noted to have abnormal metabolic panel with a sodium 148, potassium of 2.2, BUN/creatinine of 15/0.9, T. bili of 0.9, AST of 13, ALT of 11, magnesium 1.9, BNP of 269, troponin level of 279 then 300 then 327.  Patient was initiated on electrolyte replacement, IV antibiotics with vancomycin/Zosyn, notable elevated serum lactate around 5.  Blood gases with VBG and ABG and follow-ups as noted below with signs of compensation.  CXR noted a persistent large right pleural effusion, status post right IJ placement emergently in the ED for pressor support.  CTA chest noted diffuse groundglass opacities in bilateral upper lobes as well as throughout with concerns for pneumonitis as well as aspiration as well as a left small pleural effusion, stable large lipomatous mass in the right lower chest wall compressive atelectasis within the right lower lobe stable from prior and actually a small stable right pleural effusion making the chest x-ray or indicative of mass, there is also notable cholelithiasis with gallbladder distention and trace.  Cholecystic fluid need to correlate for acute cholecystitis and sacral insufficiency fracture new since 07/19/2024.  Patient showed continued hemodynamic decline with requirements of initiation of pressor support as well as blood products.  Patient was given Solu-Cortef for stress steroids, initiated on epinephrine, Levophed, vasopressin.  With regards to the atrial fibrillation the patient was initiated on amiodarone.  Myself and the ED physician did have an extensive discussion with the patient's sister.  Initially it was thought that the patient's nephew Kalpesh Lorenzo was the power of   but he had stated to myself and the ED physician that he does not formally have paperwork with this and it was only loosely mentioned in the past but never ever followed through on.  We did reengage with the patient's sister multiple times to update her with regards to the care of her sister, worsening mentation, concerns for sepsis, this enlarged lipomatous mass causing compressive atelectasis, pneumonia and possible biliary dysfunction.  Given her poor nutritional status now only weighing a meters 61 pounds down about 13 pounds from 74 pounds a little over a month ago she is a very very high risk for decompensation from a hemodynamic standpoint, surgical standpoint cardiac standpoint.  She would be a very poor candidate for any surgical intervention with regards to the lipomatous mass as well as at high risk for possible pneumothorax and a high risk for poor healing as well.  It was extensively described the risk versus benefits of continued therapy and aggressive management and at this time she was simply too unstable for any significant intervention at this time/aggressive intervention and the patient's sister Freya Lorenzo had elected to proceed with comfort measures.  I did extensively review what entailed being a full code, DNR, DNR/DNI and DNR CCO and the patient's sister had expressed that if she were to not have any meaningful quality of life or any significant return to her baseline prior to even a month ago that she would likely not want to proceed this way.  Upon extensive reevaluation by both myself and the ED physician the patient has had progressively worsening encephalopathy and inability to make her own decisions at this time.  It was both deemed by myself and the ED physician that given her altered mentation she could not fully grasp the concept of what was occurring hence the extensive discussion with the patient's sister.  The patient's CODE STATUS was changed from a DNR to a DNR CCO,  hospice was consulted and end-of-life order set was utilized with anxiolytics, analgesics, antipyretics, and antisecretory medications.  Pressor support was held as well.  Unfortunately I was unable to obtain any meaningful information from the patient regarding her presentation or management thus far or even during her prior ED visit and hospitalization.  Please see my significant event note from 07/21/2024 and currently the patient is notably much more sicker in appearance and on examination and currently unable to fully assist or participate in her care and decision making at this time.    ED Course (From Provider):  ED Course as of 08/27/24 0618   Mon Aug 26, 2024   2151 Patient twelve-lead EKG interpreted by myself shows atrial fibrillation, rapid ventricular rate of 116, normal axis, normal QRS duration, normal QT, no STEMI. [WJ]   2231 POCT Potassium, Venous(!!): 2.0  Magnesium was added on, IV potassium was ordered.  Hypokalemia could explain the patient's new arrhythmia. [WJ]   2231 XR chest 1 view  Chest x-ray independently reviewed, appears to have persistent opacity in right lung.  Radiology read is pending. [WJ]   2239 HEMOGLOBIN(!): 8.5  Hemoglobin stable when compared to prior [WJ]   2239 WBC: 7.5  Patient did not have a leukocytosis [WJ]   2312 SODIUM(!): 148  Likely dehydrated [WJ]   Tue Aug 27, 2024   0016 Patient just had a A-fib RVR with heart rates up into the 170s.  Aborted with a dose of IV metoprolol.  Will start heparin at this time. [WJ]   0153 Arterial line on left placed, patient hypotensive. Will shock [WJ]   0207 Cardioverted. Still hypotensive, will give stress dose steriod and 3rd pressor start [WJ]   0304 Charge nurse tasked with calling for family members, as my prior attempts have failed.  I did leave voicemails with nephew and sibling around 1AM [WJ]   0316 POCT Lactate, Venous(!!): 5.5 [WJ]   0316 BLOOD GAS VENOUS FULL PANEL(!!)  This result is likely the arterial blood gas. [WJ]    0400 2nd shock [WJ]   0412 I spoke to Dr Brown, recommended continued supportive care antibiotics at this time.  Due to how critically ill the patient is, deferred acute surgical intervention [WJ]   0440 HEMOGLOBIN(!): 6.6  Trauma blood to be given [WJ]   0541 Dr Cabrera discussed with Freya, the sibling. I was at present for phone call. Patient currently does not have capacity. Patient now DNR ml and will initiate comfort measured.  [WJ]      ED Course User Index  [WJ] Gee Tello,          Diagnoses as of 08/27/24 0618   A-fib (Multi)     Relevant Results  Results for orders placed or performed during the hospital encounter of 08/26/24 (from the past 24 hour(s))   POCT GLUCOSE   Result Value Ref Range    POCT Glucose 102 (H) 74 - 99 mg/dL   Sars-CoV-2 PCR   Result Value Ref Range    Coronavirus 2019, PCR Not Detected Not Detected   CBC and Auto Differential   Result Value Ref Range    WBC 7.5 4.4 - 11.3 x10*3/uL    nRBC 0.0 0.0 - 0.0 /100 WBCs    RBC 2.29 (L) 4.00 - 5.20 x10*6/uL    Hemoglobin 8.5 (L) 12.0 - 16.0 g/dL    Hematocrit 26.2 (L) 36.0 - 46.0 %     (H) 80 - 100 fL    MCH 37.1 (H) 26.0 - 34.0 pg    MCHC 32.4 32.0 - 36.0 g/dL    RDW 17.6 (H) 11.5 - 14.5 %    Platelets 174 150 - 450 x10*3/uL    Neutrophils % 81.3 40.0 - 80.0 %    Immature Granulocytes %, Automated 0.8 0.0 - 0.9 %    Lymphocytes % 6.3 13.0 - 44.0 %    Monocytes % 9.8 2.0 - 10.0 %    Eosinophils % 0.9 0.0 - 6.0 %    Basophils % 0.9 0.0 - 2.0 %    Neutrophils Absolute 6.11 (H) 1.60 - 5.50 x10*3/uL    Immature Granulocytes Absolute, Automated 0.06 0.00 - 0.50 x10*3/uL    Lymphocytes Absolute 0.47 (L) 0.80 - 3.00 x10*3/uL    Monocytes Absolute 0.74 0.05 - 0.80 x10*3/uL    Eosinophils Absolute 0.07 0.00 - 0.40 x10*3/uL    Basophils Absolute 0.07 0.00 - 0.10 x10*3/uL   BLOOD GAS VENOUS FULL PANEL   Result Value Ref Range    POCT pH, Venous 7.35 7.33 - 7.43 pH    POCT pCO2, Venous 35 (L) 41 - 51 mm Hg    POCT pO2, Venous 40 35 - 45 mm  Hg    POCT SO2, Venous 49 45 - 75 %    POCT Oxy Hemoglobin, Venous 47.6 45.0 - 75.0 %    POCT Hematocrit Calculated, Venous 26.0 (L) 36.0 - 46.0 %    POCT Sodium, Venous 149 (H) 136 - 145 mmol/L    POCT Potassium, Venous 2.0 (LL) 3.5 - 5.3 mmol/L    POCT Chloride, Venous 115 (H) 98 - 107 mmol/L    POCT Ionized Calicum, Venous 1.23 1.10 - 1.33 mmol/L    POCT Glucose, Venous 85 74 - 99 mg/dL    POCT Lactate, Venous 1.7 0.4 - 2.0 mmol/L    POCT Base Excess, Venous -5.7 (L) -2.0 - 3.0 mmol/L    POCT HCO3 Calculated, Venous 19.3 (L) 22.0 - 26.0 mmol/L    POCT Hemoglobin, Venous 8.6 (L) 12.0 - 16.0 g/dL    POCT Anion Gap, Venous 17.0 10.0 - 25.0 mmol/L    Patient Temperature 37.0 degrees Celsius    FiO2 21 %   Coagulation Screen   Result Value Ref Range    Protime 18.0 (H) 9.8 - 12.8 seconds    INR 1.6 (H) 0.9 - 1.1    aPTT 38 27 - 38 seconds   Hepatic Function Panel   Result Value Ref Range    Albumin 2.8 (L) 3.4 - 5.0 g/dL    Bilirubin, Total 0.9 0.0 - 1.2 mg/dL    Bilirubin, Direct 0.3 0.0 - 0.3 mg/dL    Alkaline Phosphatase 125 33 - 136 U/L    ALT 11 7 - 45 U/L    AST 13 9 - 39 U/L    Total Protein 5.4 (L) 6.4 - 8.2 g/dL   Basic metabolic panel   Result Value Ref Range    Glucose 88 74 - 99 mg/dL    Sodium 148 (H) 136 - 145 mmol/L    Potassium 2.2 (LL) 3.5 - 5.3 mmol/L    Chloride 118 (H) 98 - 107 mmol/L    Bicarbonate 18 (L) 21 - 32 mmol/L    Anion Gap 14 10 - 20 mmol/L    Urea Nitrogen 15 6 - 23 mg/dL    Creatinine 0.90 0.50 - 1.05 mg/dL    eGFR 68 >60 mL/min/1.73m*2    Calcium 8.0 (L) 8.6 - 10.3 mg/dL   B-type natriuretic peptide   Result Value Ref Range     (H) 0 - 99 pg/mL   Troponin I, High Sensitivity   Result Value Ref Range    Troponin I, High Sensitivity 279 (HH) 0 - 13 ng/L   Magnesium   Result Value Ref Range    Magnesium 1.90 1.60 - 2.40 mg/dL   Troponin I, High Sensitivity   Result Value Ref Range    Troponin I, High Sensitivity 300 (HH) 0 - 13 ng/L   BLOOD GAS ARTERIAL FULL PANEL   Result Value  Ref Range    POCT pH, Arterial 7.39 7.38 - 7.42 pH    POCT pCO2, Arterial 25 (L) 38 - 42 mm Hg    POCT pO2, Arterial 142 (H) 85 - 95 mm Hg    POCT SO2, Arterial 100 94 - 100 %    POCT Oxy Hemoglobin, Arterial 97.7 94.0 - 98.0 %    POCT Hematocrit Calculated, Arterial 22.0 (L) 36.0 - 46.0 %    POCT Sodium, Arterial 142 136 - 145 mmol/L    POCT Potassium, Arterial 3.6 3.5 - 5.3 mmol/L    POCT Chloride, Arterial 115 (H) 98 - 107 mmol/L    POCT Ionized Calcium, Arterial 1.16 1.10 - 1.33 mmol/L    POCT Glucose, Arterial 111 (H) 74 - 99 mg/dL    POCT Lactate, Arterial 5.2 (HH) 0.4 - 2.0 mmol/L    POCT Base Excess, Arterial -8.9 (L) -2.0 - 3.0 mmol/L    POCT HCO3 Calculated, Arterial 15.1 (L) 22.0 - 26.0 mmol/L    POCT Hemoglobin, Arterial 7.3 (L) 12.0 - 16.0 g/dL    POCT Anion Gap, Arterial 16 10 - 25 mmo/L    Patient Temperature 37.0 degrees Celsius    FiO2 28 %   CBC and Auto Differential   Result Value Ref Range    WBC 11.2 4.4 - 11.3 x10*3/uL    nRBC 0.0 0.0 - 0.0 /100 WBCs    RBC 1.77 (L) 4.00 - 5.20 x10*6/uL    Hemoglobin 6.6 (L) 12.0 - 16.0 g/dL    Hematocrit 19.9 (L) 36.0 - 46.0 %     (H) 80 - 100 fL    MCH 37.3 (H) 26.0 - 34.0 pg    MCHC 33.2 32.0 - 36.0 g/dL    RDW 17.7 (H) 11.5 - 14.5 %    Platelets 223 150 - 450 x10*3/uL    Neutrophils % 73.4 40.0 - 80.0 %    Immature Granulocytes %, Automated 1.4 (H) 0.0 - 0.9 %    Lymphocytes % 13.6 13.0 - 44.0 %    Monocytes % 10.2 2.0 - 10.0 %    Eosinophils % 0.4 0.0 - 6.0 %    Basophils % 1.0 0.0 - 2.0 %    Neutrophils Absolute 8.18 (H) 1.60 - 5.50 x10*3/uL    Immature Granulocytes Absolute, Automated 0.16 0.00 - 0.50 x10*3/uL    Lymphocytes Absolute 1.52 0.80 - 3.00 x10*3/uL    Monocytes Absolute 1.14 (H) 0.05 - 0.80 x10*3/uL    Eosinophils Absolute 0.05 0.00 - 0.40 x10*3/uL    Basophils Absolute 0.11 (H) 0.00 - 0.10 x10*3/uL   Comprehensive metabolic panel   Result Value Ref Range    Glucose 129 (H) 74 - 99 mg/dL    Sodium 143 136 - 145 mmol/L    Potassium 3.9  3.5 - 5.3 mmol/L    Chloride 114 (H) 98 - 107 mmol/L    Bicarbonate 15 (L) 21 - 32 mmol/L    Anion Gap 18 10 - 20 mmol/L    Urea Nitrogen 12 6 - 23 mg/dL    Creatinine 0.71 0.50 - 1.05 mg/dL    eGFR 90 >60 mL/min/1.73m*2    Calcium 7.2 (L) 8.6 - 10.3 mg/dL    Albumin 2.0 (L) 3.4 - 5.0 g/dL    Alkaline Phosphatase 90 33 - 136 U/L    Total Protein 3.7 (L) 6.4 - 8.2 g/dL    AST 16 9 - 39 U/L    Bilirubin, Total 1.0 0.0 - 1.2 mg/dL    ALT 10 7 - 45 U/L   Troponin I, High Sensitivity   Result Value Ref Range    Troponin I, High Sensitivity 327 (HH) 0 - 13 ng/L   BLOOD GAS VENOUS FULL PANEL   Result Value Ref Range    POCT pH, Venous 7.42 7.33 - 7.43 pH    POCT pCO2, Venous 23 (L) 41 - 51 mm Hg    POCT pO2, Venous 148 (H) 35 - 45 mm Hg    POCT SO2, Venous 99 (H) 45 - 75 %    POCT Oxy Hemoglobin, Venous 97.0 (H) 45.0 - 75.0 %    POCT Hematocrit Calculated, Venous 21.0 (L) 36.0 - 46.0 %    POCT Sodium, Venous 142 136 - 145 mmol/L    POCT Potassium, Venous 3.9 3.5 - 5.3 mmol/L    POCT Chloride, Venous 113 (H) 98 - 107 mmol/L    POCT Ionized Calicum, Venous 1.14 1.10 - 1.33 mmol/L    POCT Glucose, Venous 125 (H) 74 - 99 mg/dL    POCT Lactate, Venous 5.5 (HH) 0.4 - 2.0 mmol/L    POCT Base Excess, Venous -8.6 (L) -2.0 - 3.0 mmol/L    POCT HCO3 Calculated, Venous 14.9 (L) 22.0 - 26.0 mmol/L    POCT Hemoglobin, Venous 6.9 (L) 12.0 - 16.0 g/dL    POCT Anion Gap, Venous 18.0 10.0 - 25.0 mmol/L    Patient Temperature 37.0 degrees Celsius    FiO2 28 %   Morphology   Result Value Ref Range    RBC Morphology See Below     Polychromasia Mild     Hypochromia Mild     Target Cells Few     Charisma Cells Few    Lactate   Result Value Ref Range    Lactate 5.2 (HH) 0.4 - 2.0 mmol/L   Type and Screen   Result Value Ref Range    ABO TYPE A     Rh TYPE POS     ANTIBODY SCREEN NEG    Lactate   Result Value Ref Range    Lactate 5.5 (HH) 0.4 - 2.0 mmol/L   Prepare RBC   Result Value Ref Range    PRODUCT CODE C9245H30     Unit Number [id number]      Unit ABO O     Unit RH NEG     Dispense Status TR     Blood Expiration Date 9/19/2024 11:59:00 PM EDT     PRODUCT BLOOD TYPE 9500     UNIT VOLUME 350     XM INTEP COMP       XR chest 1 view    Result Date: 8/27/2024  STUDY: Chest Radiograph;  8/27/2024 at 12:53 AM INDICATION: Central line placement. COMPARISON: XR chest 8/26/2024, XR chest 7/20/2024, XR chest 7/19/2024. ACCESSION NUMBER(S): [id number] ORDERING CLINICIAN: ORQUIDEA CLAYTON TECHNIQUE:  Frontal chest was obtained at 0053 hours. FINDINGS: CARDIOMEDIASTINAL SILHOUETTE: Cardiomediastinal silhouette is stable in size and configuration. Right IJ central line is demonstrated terminating in the region of the cavoatrial junction  LUNGS: Large right pleural effusion is again demonstrated with possible underlying airspace disease in the right lung.  Mild overall pulmonary vascular congestion is suggested.  Left lung is grossly clear.  ABDOMEN: No remarkable upper abdominal findings.  BONES: No acute osseous changes.    Persistent large right pleural effusion.  Interval placement of right IJ central line.  No pneumothorax. Signed by Samson Meade MD    CT angio chest for pulmonary embolism    Result Date: 8/27/2024  Interpreted By:  Katie Mahoney, STUDY: CT ANGIO CHEST FOR PULMONARY EMBOLISM; CT ABDOMEN PELVIS W IV CONTRAST;  8/27/2024 2:58 am   INDICATION: Signs/Symptoms:hypoxia, hypoglycemia, recent lung mass.   COMPARISON: CT chest, abdomen and pelvis 07/19/2024   ACCESSION NUMBER(S): [id number]; [id number]   ORDERING CLINICIAN: ORQUIDEA CLAYTON   TECHNIQUE: Axial CT images of the chest, abdomen and pelvis with coronal and sagittal reconstructed images obtained after intravenous administration of contrast. Maximum intensity projection images of the chest were reconstructed and reviewed.   FINDINGS: CHEST:   VESSELS: No aortic aneurysm. No pulmonary embolism to the segmental level. Limited evaluation of subsegmental pulmonary arteries at the lung bases due to  motion artifact. HEART: Normal size. Severe coronary artery calcifications. No pericardial effusion. MEDIASTINUM AND DARIUS: No pathologically enlarged thoracic lymph nodes. LUNG, PLEURA, LARGE AIRWAYS: Stable small right pleural effusion. New small left pleural effusion. Grossly stable heterogeneous, fat containing mass in the right lower thorax measuring 9.9 x 9.4 cm. Mucus or secretions in the right mainstem and lower lobe bronchi. Compressive atelectasis of the right lower lobe, similar mild compressive atelectasis of the left lower lobe. New mild ground-glass opacities in the bilateral upper and left lower lobes. CHEST WALL AND LOWER NECK: Within normal limits. Subacute fractures of the right 9th, 10th and 11th ribs.   ABDOMEN:   BONES: Insufficiency type bilateral sacral raul are and body fractures, new from 07/19/2024. Chronic compression fractures of T12, L1 and L3. Diffuse degenerative disc changes and lumbar facet arthropathy. ABDOMINAL WALL: Within normal limits.   LIVER: Mild heterogeneous attenuation of the liver. No discrete mass or focal parenchymal abnormality. BILE DUCTS: No biliary dilatation. GALLBLADDER: Cholelithiasis and gallbladder distension. No pericholecystic inflammatory stranding. Trace pericholecystic fluid. PANCREAS: Within normal limits. SPLEEN: Within normal limits. ADRENALS: Within normal limits. KIDNEYS AND URETERS: Horseshoe kidney again noted. Symmetric renal enhancement. No hydronephrosis or perinephric fluid collection.  No hydroureter.   VESSELS: The aorta and IVC are within normal limits. Severe aortic calcification. RETROPERITONEUM: No pathologically enlarged lymph nodes.   PELVIS:   REPRODUCTIVE ORGANS: No pelvic mass or significant free pelvic fluid. BLADDER: Within normal limits.   BOWEL: No dilated bowel. Colonic diverticulosis without acute diverticulitis. Normal appendix. PERITONEUM: No ascites or free air, no fluid collection.       No pulmonary embolism to the segmental  level.   New ground-glass opacities in the bilateral upper lobes and left lower lobe are nonspecific but may represent pneumonia or aspiration pneumonitis, particularly given the presence of mucus/secretions in the right mainstem and lower lobe bronchi.   New small left pleural effusion and mild left lower lobe dependent atelectasis.   Stable large lipomatous mass in the right lower chest with compressive atelectasis of the right lower lobe. Stable small right pleural effusion.   Cholelithiasis, gallbladder distention and trace pericholecystic fluid. Please correlate for symptoms of acute cholecystitis and consider further evaluation with ultrasound.   Sacral insufficiency fracture, new since 07/19/2024.   MACRO: None   Signed by: Katie Mahoney 8/27/2024 3:33 AM Dictation workstation:   [id number]    CT abdomen pelvis w IV contrast    Result Date: 8/27/2024  Interpreted By:  Katie Mahoney, STUDY: CT ANGIO CHEST FOR PULMONARY EMBOLISM; CT ABDOMEN PELVIS W IV CONTRAST;  8/27/2024 2:58 am   INDICATION: Signs/Symptoms:hypoxia, hypoglycemia, recent lung mass.   COMPARISON: CT chest, abdomen and pelvis 07/19/2024   ACCESSION NUMBER(S): [id number]; [id number]   ORDERING CLINICIAN: ORQUIDEA CLAYTON   TECHNIQUE: Axial CT images of the chest, abdomen and pelvis with coronal and sagittal reconstructed images obtained after intravenous administration of contrast. Maximum intensity projection images of the chest were reconstructed and reviewed.   FINDINGS: CHEST:   VESSELS: No aortic aneurysm. No pulmonary embolism to the segmental level. Limited evaluation of subsegmental pulmonary arteries at the lung bases due to motion artifact. HEART: Normal size. Severe coronary artery calcifications. No pericardial effusion. MEDIASTINUM AND DARIUS: No pathologically enlarged thoracic lymph nodes. LUNG, PLEURA, LARGE AIRWAYS: Stable small right pleural effusion. New small left pleural effusion. Grossly stable heterogeneous, fat containing  mass in the right lower thorax measuring 9.9 x 9.4 cm. Mucus or secretions in the right mainstem and lower lobe bronchi. Compressive atelectasis of the right lower lobe, similar mild compressive atelectasis of the left lower lobe. New mild ground-glass opacities in the bilateral upper and left lower lobes. CHEST WALL AND LOWER NECK: Within normal limits. Subacute fractures of the right 9th, 10th and 11th ribs.   ABDOMEN:   BONES: Insufficiency type bilateral sacral raul are and body fractures, new from 07/19/2024. Chronic compression fractures of T12, L1 and L3. Diffuse degenerative disc changes and lumbar facet arthropathy. ABDOMINAL WALL: Within normal limits.   LIVER: Mild heterogeneous attenuation of the liver. No discrete mass or focal parenchymal abnormality. BILE DUCTS: No biliary dilatation. GALLBLADDER: Cholelithiasis and gallbladder distension. No pericholecystic inflammatory stranding. Trace pericholecystic fluid. PANCREAS: Within normal limits. SPLEEN: Within normal limits. ADRENALS: Within normal limits. KIDNEYS AND URETERS: Horseshoe kidney again noted. Symmetric renal enhancement. No hydronephrosis or perinephric fluid collection.  No hydroureter.   VESSELS: The aorta and IVC are within normal limits. Severe aortic calcification. RETROPERITONEUM: No pathologically enlarged lymph nodes.   PELVIS:   REPRODUCTIVE ORGANS: No pelvic mass or significant free pelvic fluid. BLADDER: Within normal limits.   BOWEL: No dilated bowel. Colonic diverticulosis without acute diverticulitis. Normal appendix. PERITONEUM: No ascites or free air, no fluid collection.       No pulmonary embolism to the segmental level.   New ground-glass opacities in the bilateral upper lobes and left lower lobe are nonspecific but may represent pneumonia or aspiration pneumonitis, particularly given the presence of mucus/secretions in the right mainstem and lower lobe bronchi.   New small left pleural effusion and mild left lower lobe  dependent atelectasis.   Stable large lipomatous mass in the right lower chest with compressive atelectasis of the right lower lobe. Stable small right pleural effusion.   Cholelithiasis, gallbladder distention and trace pericholecystic fluid. Please correlate for symptoms of acute cholecystitis and consider further evaluation with ultrasound.   Sacral insufficiency fracture, new since 07/19/2024.   MACRO: None   Signed by: Katie Mahoney 8/27/2024 3:33 AM Dictation workstation:   [id number]    XR chest 1 view    Result Date: 8/26/2024  INDICATION: Hypoglycemia. COMPARISON: 07/20/24. TECHNIQUE: AP chest, 2231 hours. FINDINGS: The heart and mediastinum are obscured by the elevated right hemidiaphragm and right pleural effusion.  A soft tissue mass is noted in the majority of the right hemithorax.  The small left pleural effusion is present. Dextroscoliosis is present.  No acute osseous changes.    Large right pleural effusion.  This has decreased since the prior examination. Small left pleural effusion. Soft tissue mass of the right hemithorax. Signed by Joselo Sharma MD    Scheduled medications:  [Held by provider] hydrocortisone sodium succinate, 100 mg, intravenous, q8h      Continuous medications:  [Held by provider] amiodarone, 0.5-1 mg/min, Last Rate: Stopped (08/27/24 0546)  [Held by provider] EPINEPHrine, 0-1 mcg/kg/min, Last Rate: Stopped (08/27/24 0546)  [Held by provider] lactated Ringer's, 100 mL/hr, Last Rate: Stopped (08/27/24 0112)  [Held by provider] norepinephrine, 0-0.3 mcg/kg/min, Last Rate: Stopped (08/27/24 0546)  [Held by provider] vasopressin, 0.03 Units/min, Last Rate: Stopped (08/27/24 0546)      PRN medications:  PRN medications: acetaminophen, atropine, glycopyrrolate, haloperidol lactate, LORazepam, LORazepam, morphine, morphine, ondansetron     Past Medical History  She has no past medical history on file.    Surgical History  She has no past surgical history on file.     Social  History  She reports that she has never smoked. She has never used smokeless tobacco. No history on file for alcohol use and drug use.    Family History  No family history on file.    Allergies  Patient has no known allergies.    Code Status  DNR Comfort Measures Only     Review of Systems   Unable to perform ROS: Mental status change       Last Recorded Vitals  BP 91/59   Pulse 92   Temp 36.1 °C (97 °F)   Resp (!) 26   Wt (!) 27.8 kg (61 lb 3.2 oz)   SpO2 95%      Physical Exam  Vitals and nursing note reviewed.   Constitutional:       General: She is in acute distress.      Appearance: She is ill-appearing, toxic-appearing and diaphoretic.      Comments: Awake but not interactive, she is not alert and oriented or simply does not respond to questioning, when you say her name she will open her eyes and occasionally look towards you but not always, she is quite ill-appearing, somewhat diaphoretic, tachypneic, she does have a slight jaundiced appearance to her skin as well as her sclera, she does not appropriately interactive with examination, there is profound diffuse muscle wasting   HENT:      Mouth/Throat:      Mouth: Mucous membranes are dry.      Pharynx: Oropharynx is clear.   Eyes:      General: No scleral icterus.     Extraocular Movements: Extraocular movements intact.      Pupils: Pupils are equal, round, and reactive to light.   Cardiovascular:      Rate and Rhythm: Tachycardia present. Rhythm irregular.      Pulses: Normal pulses.      Heart sounds: Murmur (Glascock best at the right lower sternal border/apex) heard.   Pulmonary:      Effort: Respiratory distress present.      Breath sounds: Wheezing and rhonchi present.      Comments: Diminished breath sounds are all lung fields with notable extremity wheezing and scattered rhonchi throughout  Abdominal:      General: Abdomen is flat. There is no distension.      Palpations: Abdomen is soft.      Comments: Abdomen is soft without any noted  masses/organomegaly, does not appear to be in any signs of distress at this time   Musculoskeletal:      Comments: Could not fully assist given altered mentation, there is notable diffuse muscle wasting   Skin:     General: Skin is warm.   Neurological:      Comments: Patient will open her eyes to her name but does not follow any commands, she did move her upper extremities slightly spontaneously but would only retract her lower extremities equally bilaterally to painful stimuli, at times she would be appearing to be staring off and not responsive to any questions with occasional gasp for air and gurgling of her secretions, she did not follow commands for finger-to-nose/heel-to-shin, could not fully assess for any dysarthria,   Psychiatric:      Comments: Encephalopathic and not appropriately interactive         Assessment/Plan   Assessment & Plan  A-fib (Multi)      CODE STATUS  End-of-life care  -Extensive discussion by myself and ED team with the patient's sister Freya Lorenzo as well as the patient's nephew Kalpesh Lorenzo  -Per both these family members there is no other relatives that are within the picture that would make decisions for the patient at this time  -Please see extensive documentation above with regards to discussion  -Patient's CODE STATUS was changed from DNR to DNR CCO  -Hospice consult was placed  -End-of-life order set was utilized with analgesics, anxiolytics, antipyretics and antisecretory medications    Sepsis with shock  -Suspect multifactorial in setting of possible pneumonia, underlying gallbladder pathology, cardiac dysfunction and lung mass  -Despite fluid boluses and initiation of 3 pressors plus Solu-Cortef showed very little improvement with regards to her mentation which actually worsened with ever so slight improvement in her pressures  -Blood cultures x 2 were ordered as well as pending UA/urine culture  -Patient was given IV antibiotics with vancomycin and Zosyn  -Further  worsening of pressures could be in setting of analgesics/anxiolytics initially required on presentation and in need for central line placement    Pneumonia  Cholecystitis?  -Notable infiltrates on imaging as well as concerns for a distended gallbladder with pericholecystic fluid    Acute hypoxic respiratory failure  -Per discussion with the ED team the patient did desaturated to about 85% requiring 2 to 4 L nasal cannula    Elevated serum lactate  -Suspect multifactorial in setting of underlying respiratory dysfunction, and pneumonia, possible intra-abdominal infectious process, cardiac dysfunction    Lipomatous lung mass with compressive atelectasis  -Would require extensive workup by interventional radiology, thoracic surgery    Osteoporosis  Osteoarthritis  Acute on chronic ambulatory dysfunction  -During previous ED visit was noted to have multiple spinal fractures which were nonoperative    Acute metabolic encephalopathy  -Multifactorial in setting of above    Code Status: DNR - CCO (per discussion with the patient's sister Freya Lorenzo by both myself and the ED physician    >>> Both the patient's clinic contacts and requested to please be kept updated for any acute changes in her condition or if she were to pass.  They were both very understanding of the current situation and felt that she would not want to be kept alive artificially or if there was little to no quality of life available to her and given her progressive decline even simply over the past 1 month they do understand that any aggressive measures would require extensive therapies which she may not have the full capacity to tolerate side effects or the healing process especially in her setting of poor nutritional status.     Yonas Cabrera DO    Dragon dictation software was used to dictate this note and thus there may be minor errors in translation/transcription including garbled speech or misspellings. Please contact for clarification if  needed.

## 2024-08-27 NOTE — PROGRESS NOTES
SW placed call to pt's sister to discuss hospice, sister expressed confusion regarding Hospice care, stated she would talk with her son who should be in to visit pt. SW and TCC met with pt's nephew Yevgeniy and his wife at bedside, shared that pt's sister does get a little confused at times, they inquired about Hospice care. SW and TCC discussed GIP status with family and what hospice services look like while inpatient, discussed [facility] (with financial disclosure), family was in agreement for referral to [facility]. SW placed referral in [facility], agency to contact family and discuss services. SW to continue following as needed.    eNal Cardozo, MSW, LSW ([id number])   Care Transitions

## 2024-08-27 NOTE — ED PROVIDER NOTES
HPI   Chief Complaint   Patient presents with    Hypoglycemia    Rapid Heart Rate       HPI    HISTORY OF PRESENT ILLNESS:  Patient is a 74-year-old female presents to the emergency for rapid heart rate.  Patient was found to have heart rates in the 130s.  Patient also found to be hypoglycemic at 44.  Prehospital setting, patient did receive D10.  Patient was also noted today to be jaundice.  This is in setting of having decreased oral intake for the past few days.      Past Medical History: New right lung mass, GUNNAR, GERD, asthma, anemia, osteoporosis, Ibrahim syndrome, melanoma  Past Surgical History: Hysterectomy and lung wedge resection  Family History: family history not pertinent to presenting problem or chief complaint  Social History: Patient is from the [facility]    __________________________________________________________  PHYSICAL EXAM:    Appearance:  elderly, cachectic chronically ill-appearing, alert, oriented to name, current location of the hospital, patient unable to tell me how she came to the emergency department.  Skin: Intact,  dry skin, no lesions, rash, petechiae or purpura.   Eyes: PERRLA, EOMs intact,  Conjunctiva pink with no redness or exudates.    HENT: Normocephalic, atraumatic. Nares patent.  Dry oral mucosa present.  Neck: Supple. Trachea at midline.   Pulmonary: Diminished breath sounds bilaterally, right worse than left  Cardiac: Tachycardic, irregularly irregular,no rubs, murmurs, or gallops. No JVD,   Abdomen: Abdomen is soft, nontender, and nondistended.  No palpable organomegaly.  No rebound or guarding.  No CVA tenderness. Nonsurgical abdomen  Genitourinary: Exam deferred.  Musculoskeletal: Musculature is very cachectic.  No edema, pain, cyanosis, or deformity in extremities. Pulses weak, +1 neurological:  Cranial nerves are grossly intact, grossly normal sensation, no weakness, no focal findings identified.      __________________________________________________________  MEDICAL  DECISION MAKING:    Patient was seen and examined. Please note that this patient had a very complicated ED course.  The patient had a few days of decreased oral intake.  She was sent in for concern of new onset atrial fibrillation with rapid ventricular response.  Upon initial arrival, the patient had a heart rate between 100 220 with normal blood pressure.  Was on room air.  Patient at this time was not hypotensive.  Therefore, given concern for possible infection, I held off on rate control as the rate may be sustaining cardiac output.  Patient was provided IV fluids.  Workup was initiated.  The patient initially did not have a leukocytosis.  Hemoglobin was 8.5 stable anemia.  Patient was noted to be hypokalemic at 2.0 on a venous gas.  IV repletion was initiated.  At this time, patient began to become hypotensive.  Left voicemails and was unable to contact family members.  Metoprolol was given as the patient did not appear septic based on lack of leukocytosis.  Patient was initially responsive.  However later on, patient began to slowly become more hypotensive.  Heparin was ordered for the new onset atrial fibrillation.  Low-dose Levophed was started.  I placed a central line due to lack of access.  I then placed a arterial line on the second attempt.  At this time, patient was found to be markedly hypotensive based on art line pressure with good waveform.  Due to the atrial fibrillation with rapid ventricular response, patient was now cardioverted.  Patient still remained hypotensive and vasopressor and ultimately epinephrine was added on.  Patient continued to be hypotensive.  Nursing home did fax over patient's DNR CCA paperwork.  I also ultimately managed to get a hold of Freya, patient's sibling.  The patient was confirmed DNR CCA.  Stated that the patient's nephew typically makes decisions.  I was still unable to get a hold of the sibling.  Patient became more anemic.  Though there was concern that this  could be delusional in nature, blood was ordered.  CT imaging was obtained once the patient was stable.  Did show no pulmonary embolism, new groundglass opacity which could be pneumonia or aspiration, pleural effusion, cholelithiasis, gallbladder distention, pericholecystic cystic fluid was noted.  A second cardioversion was performed when the patient became atrial fibration with RVR while on multiple pressors. The patient case had been discussed with on-call surgeon.  Given how ill the patient was, recommended admission to [facility] without current surgical intervention.  Later, called back stating Dr Brown reviewed CT and did not believe there was significant pericholecystic fluid/inflammation surrounding the gallbladder. Case discussed with [facility].  [facility] was able to discuss the case with the sister.  After evaluating patient at bedside, Dr. Cabrera and I had a prolonged conversation with Freya over the phone.  After discussing the patient's prognosis, current critical state, and possible treatment options, Freya was agreeable with having the patient made DNR comfort care.  The patient currently does not have capacity to make decisions at this time, given my evaluation of the patient in addition to Dr. Cabrera's. Evaluation. I had explained multiple prior procedures to the patient and she did not appear to understand and appeared encephalopathic, likely related to the patient's current critical state.  Dr Cabrera placed the DNR comfort care order in addition to admission to the floor for comfort measures.      Chronic Medical Conditions Significantly Affecting Care: Right lung mass, GUNNAR, GERD, asthma, Ibrahim syndrome, melanoma    External Records Reviewed: I reviewed recent and relevant outside records including: Patient discharge summary from July 24, 2024    [facility]  Emergency Medicine    Patient History   No past medical history on file.  No past surgical history on file.  No family history on file.  Social History      Tobacco Use    Smoking status: Never    Smokeless tobacco: Never   Substance Use Topics    Alcohol use: Not on file    Drug use: Not on file       Physical Exam   ED Triage Vitals [08/26/24 2129]   Temperature Heart Rate Respirations BP   36.9 °C (98.4 °F) (!) 106 18 116/61      Pulse Ox Temp Source Heart Rate Source Patient Position   100 % Temporal Monitor --      BP Location FiO2 (%)     -- --       Physical Exam      ED Course & [facility]   ED Course as of 08/27/24 0629   Mon Aug 26, 2024   2151 Patient twelve-lead EKG interpreted by myself shows atrial fibrillation, rapid ventricular rate of 116, normal axis, normal QRS duration, normal QT, no STEMI. [WJ]   2231 POCT Potassium, Venous(!!): 2.0  Magnesium was added on, IV potassium was ordered.  Hypokalemia could explain the patient's new arrhythmia. [WJ]   2231 XR chest 1 view  Chest x-ray independently reviewed, appears to have persistent opacity in right lung.  Radiology read is pending. [WJ]   2239 HEMOGLOBIN(!): 8.5  Hemoglobin stable when compared to prior [WJ]   2239 WBC: 7.5  Patient did not have a leukocytosis [WJ]   2312 SODIUM(!): 148  Likely dehydrated [WJ]   Tue Aug 27, 2024   0016 Patient just had a A-fib RVR with heart rates up into the 170s.  Aborted with a dose of IV metoprolol.  Will start heparin at this time. [WJ]   0153 Arterial line on left placed, patient hypotensive. Will shock [WJ]   0207 Cardioverted. Still hypotensive, will give stress dose steriod and 3rd pressor start [WJ]   0304 Charge nurse tasked with calling for family members, as my prior attempts have failed.  I did leave voicemails with nephew and sibling around 1AM [WJ]   0316 POCT Lactate, Venous(!!): 5.5 [WJ]   0316 BLOOD GAS VENOUS FULL PANEL(!!)  This result is likely the arterial blood gas. [WJ]   0400 2nd shock [WJ]   0412 I spoke to Dr Brown, recommended continued supportive care antibiotics at this time.  Due to how critically ill the patient is, deferred acute  surgical intervention [WJ]   0440 HEMOGLOBIN(!): 6.6  Trauma blood to be given [WJ]   0541 Dr Cabrera discussed with Freya, the sibling. I was at present for phone call. Patient currently does not have capacity. Patient now DNR ml and will initiate comfort measured.  [WJ]      ED Course User Index  [WJ] Gee Tello DO         Diagnoses as of 08/27/24 0629   A-fib (Multi)                 No data recorded     [place] Coma Scale Score: 15 (08/26/24 2134 : Melia Mendoza RN)                           Medical Decision Making      Procedure  Critical Care    Performed by: Gee Tello DO  Authorized by: Gee Tello DO    Critical care provider statement:     Critical care time (minutes):  96    Critical care time was exclusive of:  Separately billable procedures and treating other patients and teaching time    Critical care was necessary to treat or prevent imminent or life-threatening deterioration of the following conditions:  Shock    Critical care was time spent personally by me on the following activities:  Blood draw for specimens, development of treatment plan with patient or surrogate, evaluation of patient's response to treatment, examination of patient, ordering and review of laboratory studies, ordering and performing treatments and interventions, ordering and review of radiographic studies, re-evaluation of patient's condition, review of old charts, discussions with consultants, obtaining history from patient or surrogate and interpretation of cardiac output measurements    Care discussed with: admitting provider    Central Line    Performed by: Gee Tello DO  Authorized by: Gee Tello DO    Consent:     Consent obtained:  Emergent situation    Risks discussed:  Arterial puncture, bleeding, infection, nerve damage, pneumothorax and incorrect placement    Alternatives discussed:  No treatment and delayed treatment  Universal protocol:     Procedure explained and questions answered to patient  or proxy's satisfaction: yes      Relevant documents present and verified: yes      Test results available: yes      Imaging studies available: yes      Required blood products, implants, devices, and special equipment available: yes      Site/side marked: yes      Patient identity confirmed:  Verbally with patient, arm band and provided demographic data  Pre-procedure details:     Indication(s): central venous access      Hand hygiene: Hand hygiene performed prior to insertion      Sterile barrier technique: All elements of maximal sterile technique followed      Skin preparation:  Chlorhexidine    Skin preparation agent: Skin preparation agent completely dried prior to procedure    Sedation:     Sedation type:  None  Anesthesia:     Anesthesia method:  Local infiltration    Local anesthetic:  Lidocaine 1% WITH epi  Procedure details:     Location:  R internal jugular    Site selection rationale:  Compressible area, easily accessible    Patient position:  Reverse Trendelenburg    Procedural supplies:  Triple lumen    Catheter size:  7 Fr    Landmarks identified: yes      Ultrasound guidance: yes      Ultrasound guidance timing: real time      Sterile ultrasound techniques: Sterile gel and sterile probe covers were used      Number of attempts:  1    Successful placement: yes    Post-procedure details:     Post-procedure:  Dressing applied and line sutured    Assessment:  Blood return through all ports and no pneumothorax on x-ray    Procedure completion:  Tolerated  Insert arterial line    Performed by: Gee Tello DO  Authorized by: Gee Tello DO    Consent:     Consent obtained:  Emergent situation    Risks discussed:  Bleeding, infection, ischemia, pain and repeat procedure  Universal protocol:     Procedure explained and questions answered to patient or proxy's satisfaction: yes      Relevant documents present and verified: yes      Test results available: yes      Imaging studies available: yes       Required blood products, implants, devices, and special equipment available: yes      Site/side marked: yes      Immediately prior to procedure, a time out was called: yes    Indications:     Indications: hemodynamic monitoring and multiple ABGs    Pre-procedure details:     Skin preparation:  Chlorhexidine  Sedation:     Sedation type:  None  Anesthesia:     Anesthesia method:  None  Procedure details:     Location:  L radial    Needle gauge:  20 G    Placement technique:  Seldinger and ultrasound guided    Number of attempts:  1    Transducer: waveform confirmed    Post-procedure details:     Post-procedure:  Sutured and secured with tape    CMS:  Normal    Procedure completion:  Tolerated  Electrical Cardioversion    Performed by: Gee Tello DO  Authorized by: Gee Tello DO    Consent:     Consent obtained:  Emergent situation    Risks discussed:  Cutaneous burn, pain, induced arrhythmia and death  Universal protocol:     Patient identity confirmed:  Anonymous protocol, patient vented/unresponsive  Pre-procedure details:     Cardioversion basis:  Emergent    Rhythm:  Atrial fibrillation    Electrode placement:  Anterior-lateral  Attempt one:     Cardioversion mode:  Synchronous    Waveform:  Biphasic    Shock (Joules):  200    Shock outcome:  Conversion to normal sinus rhythm  Post-procedure details:     Patient status:  Awake    Procedure completion:  Tolerated  Electrical Cardioversion    Performed by: Gee Tello DO  Authorized by: Gee Tello DO    Consent:     Consent obtained:  Emergent situation    Risks discussed:  Cutaneous burn, death and induced arrhythmia    Alternatives discussed:  No treatment  Universal protocol:     Patient identity confirmed:  Anonymous protocol, patient vented/unresponsive  Pre-procedure details:     Cardioversion basis:  Emergent    Rhythm:  Atrial fibrillation    Electrode placement:  Anterior-lateral  Attempt one:     Cardioversion mode:  Synchronous     Waveform:  Biphasic    Shock (Joules):  200    Shock outcome:  Conversion to normal sinus rhythm  Post-procedure details:     Patient status:  Awake    Procedure completion:  Tolerated    Performed by: Gee Tello DO  Authorized by: Gee Tello DO  Cardiac Indications: hypotension                Procedure: Cardiac Ultrasound    Findings:   Views: parasternal long and parasternal short  The pericardial space was visualized and was NEGATIVE for a significant pericardial effusion.  Activity: Ventricular contractions were visualized.  LV: LV systolic function was NORMAL. and low normal ejection fraction      Impression:  Cardiac: The focused cardiac ultrasound exam had ABNORMAL findings as specified.    Comments: As noted, patient ejection fraction was borderline low-normal       Gee Tello DO  08/27/24 2012

## 2024-08-27 NOTE — PROGRESS NOTES
08/27/24 1131   Discharge Planning   Living Arrangements Other (Comment)  ([facility])   Support Systems Other (Comment)  ([facility] Staff)   Assistance Needed total care   Type of Residence Nursing home/residential care   Home or Post Acute Services Other (Comment)  (Inpatient Hospice)   Expected Discharge Disposition Hospice Res     Patient admitted from [facility] where she resides [facility]. Nephew at bedside, sister involved as well. Patient admitted for respiratory failure, sepsis, she was made DNRCCO, Hospice consult placed at this time with Social Work.

## 2024-08-27 NOTE — CARE PLAN
The patient's goals for the shift include  Patient will remain comfortable and free from agitation    The clinical goals for the shift include Remain comfortable - CCO patient

## 2024-08-27 NOTE — PROGRESS NOTES
Spiritual Care Visit    Clinical Encounter Type  Visited With: Patient and family together  Routine Visit: Introduction    Mandaeism Encounters  Mandaeism Needs: Prayer

## 2024-08-27 NOTE — ED TRIAGE NOTES
Pt BIBA from [facility] c/c of tachycardia per ems facility said pt heart rate has been around 130. When ems arrive pt BS was 44. Ems started D10. Drip. Facility also told ems that pt has been jaundice which is new  and has not been eating or drinking in the past couple days. EMS did 12lead and saw afib which is not on pt record. Pt denies any chest pain or SOB     Pt A&Ox3, pt alert calm cooperative with care. Pt resp even and unlabored.       Per ems pt is  DNR     PMH: unknown

## 2024-08-28 NOTE — PROGRESS NOTES
Kim Gillespie is a 74 y.o. female on day 1 of admission presenting with A-fib (Multi).      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  Patient's weight was 74 pounds as documented on 07/21/2024 and is currently at 61 pounds.  Per ED nursing staff documentation the patient was alert and oriented x 3 but upon discussion with the ED physician she was maybe alert and oriented x 2 and provided very basic information such as her name or date of birth and knew that she was in a hospital but did not know where or why she was in the hospital.  Patient was unable to provide any meaningful information about her presentation or histories.  Per discussion with the ED physician he had mentioned that she was notably encephalopathic and he did not feel comfortable with her making her own decisions.  There was extensive discussion between the ED physician and the patient's sister.  EMS had reported that the patient is a DNR and paperwork was received from the facility.  In brief the patient had presented to the ED due to what appeared to be atrial fibrillation with rapid ventricular response with rates in the 130s she was also noted to be hypoglycemic requiring D10 prior to arrival to the hospital.  On arrival the patient was noted to be in atrial fibrillation with rapid ventricular response.  It was noted by the facility and ED documentation that the patient has become increasingly jaundiced as well.  CBC was notable for a WBC of 7.5, hemoglobin/hematocrit 8.5/26.2, platelet count of 174 with repeat labs noting a  white blood cell count of 11.2, hemoglobin of 6.6, platelet count of 223 which was initially thought to be due to sepsis versus delusional effects as well but was ordered 1 unit of packed red blood cells.  She was noted to have abnormal metabolic panel with a sodium 148, potassium of 2.2, BUN/creatinine of 15/0.9, T. bili of 0.9, AST of 13, ALT of 11, magnesium 1.9, BNP of 269, troponin level of 279 then 300 then 327.  Patient was initiated on electrolyte replacement, IV antibiotics with vancomycin/Zosyn, notable elevated serum lactate around 5.  Blood gases with VBG and ABG and follow-ups as noted below with signs of compensation.  CXR noted a persistent large right pleural effusion, status post right IJ placement emergently in the ED for pressor support.  CTA chest noted diffuse groundglass opacities in bilateral upper lobes as well as throughout with concerns for pneumonitis as well as aspiration as well as a left small pleural effusion, stable large lipomatous mass in the right lower chest wall compressive atelectasis within the right lower lobe stable from prior and actually a small stable right pleural effusion making the chest x-ray or indicative of mass, there is also notable cholelithiasis with gallbladder distention and trace.  Cholecystic fluid need to correlate for acute cholecystitis and sacral insufficiency fracture new since 07/19/2024.  Patient showed continued hemodynamic decline with requirements of initiation of pressor support as well as blood products.  Patient was given Solu-Cortef for stress steroids, initiated on epinephrine, Levophed, vasopressin.  With regards to the atrial fibrillation the patient was initiated on amiodarone.  Myself and the ED physician did have an extensive discussion with the patient's sister.  Initially it was thought that the patient's nephew Kalpesh Lorenzo was the power of  but he had stated to myself and the ED physician that he does not formally have  paperwork with this and it was only loosely mentioned in the past but never ever followed through on.  We did reengage with the patient's sister multiple times to update her with regards to the care of her sister, worsening mentation, concerns for sepsis, this enlarged lipomatous mass causing compressive atelectasis, pneumonia and possible biliary dysfunction.  Given her poor nutritional status now only weighing a meters 61 pounds down about 13 pounds from 74 pounds a little over a month ago she is a very very high risk for decompensation from a hemodynamic standpoint, surgical standpoint cardiac standpoint.  She would be a very poor candidate for any surgical intervention with regards to the lipomatous mass as well as at high risk for possible pneumothorax and a high risk for poor healing as well.  It was extensively described the risk versus benefits of continued therapy and aggressive management and at this time she was simply too unstable for any significant intervention at this time/aggressive intervention and the patient's sister Freya Lorenzo had elected to proceed with comfort measures.  I did extensively review what entailed being a full code, DNR, DNR/DNI and DNR CCO and the patient's sister had expressed that if she were to not have any meaningful quality of life or any significant return to her baseline prior to even a month ago that she would likely not want to proceed this way.  Upon extensive reevaluation by both myself and the ED physician the patient has had progressively worsening encephalopathy and inability to make her own decisions at this time.  It was both deemed by myself and the ED physician that given her altered mentation she could not fully grasp the concept of what was occurring hence the extensive discussion with the patient's sister.  The patient's CODE STATUS was changed from a DNR to a DNR CCO, hospice was consulted and end-of-life order set was utilized with anxiolytics, analgesics,  antipyretics, and antisecretory medications.  Pressor support was held as well.  Unfortunately I was unable to obtain any meaningful information from the patient regarding her presentation or management thus far or even during her prior ED visit and hospitalization.  Please see my significant event note from 07/21/2024 and currently the patient is notably much more sicker in appearance and on examination and currently unable to fully assist or participate in her care and decision making at this time.     ED Course (From Provider):      ED Course as of 08/27/24 0618   Mon Aug 26, 2024   2151 Patient twelve-lead EKG interpreted by myself shows atrial fibrillation, rapid ventricular rate of 116, normal axis, normal QRS duration, normal QT, no STEMI. [WJ]   2231 POCT Potassium, Venous(!!): 2.0  Magnesium was added on, IV potassium was ordered.  Hypokalemia could explain the patient's new arrhythmia. [WJ]   2231 XR chest 1 view  Chest x-ray independently reviewed, appears to have persistent opacity in right lung.  Radiology read is pending. [WJ]   2239 HEMOGLOBIN(!): 8.5  Hemoglobin stable when compared to prior [WJ]   2239 WBC: 7.5  Patient did not have a leukocytosis [WJ]   2312 SODIUM(!): 148  Likely dehydrated [WJ]   Tue Aug 27, 2024   0016 Patient just had a A-fib RVR with heart rates up into the 170s.  Aborted with a dose of IV metoprolol.  Will start heparin at this time. [WJ]   0153 Arterial line on left placed, patient hypotensive. Will shock [WJ]   0207 Cardioverted. Still hypotensive, will give stress dose steriod and 3rd pressor start [WJ]   0304 Charge nurse tasked with calling for family members, as my prior attempts have failed.  I did leave voicemails with nephew and sibling around 1AM [WJ]   0316 POCT Lactate, Venous(!!): 5.5 [WJ]   0316 BLOOD GAS VENOUS FULL PANEL(!!)  This result is likely the arterial blood gas. [WJ]   0400 2nd shock [WJ]   0412 I spoke to Dr Brown, recommended continued supportive  care antibiotics at this time.  Due to how critically ill the patient is, deferred acute surgical intervention [WJ]   0570 HEMOGLOBIN(!): 6.6  Trauma blood to be given [WJ]   0517 Dr Cabrera discussed with Freya, the sibling. I was at present for phone call. Patient currently does not have capacity. Patient now DNR ml and will initiate comfort measured.  [WJ]         8/27:                  Today the patient appears somnolent and very minimally responsive to exam with occasional groaning.  Family not present at the time of visit.  At this point awaiting hospice meeting with family and likely will be made inpatient hospice here tomorrow.  Likelihood is she may not survive till tomorrow however.  At the time of visit appeared NAD and comfortable on comfort medications.    8/28:              Today the patient is seen in the presence of son.  At the time of visit she appears very minimally responsive if at all.  He describes earlier in the day however she was more awake and at least attentive although not able to speak.  He does feel overall she is not in any discomfort.  Unable to obtain any ROS due to cognitive status.       Objective     Last Recorded Vitals  BP 98/61 (BP Location: Left arm, Patient Position: Lying)   Pulse 95   Temp 35 °C (95 °F) (Temporal)   Resp 16   Wt (!) 27.8 kg (61 lb 3.2 oz)   SpO2 100%   Intake/Output last 3 Shifts:    Intake/Output Summary (Last 24 hours) at 8/28/2024 1925  Last data filed at 8/28/2024 0300  Gross per 24 hour   Intake --   Output 425 ml   Net -425 ml       Admission Weight  Weight: (!) 27.8 kg (61 lb 3.2 oz) (08/26/24 2129)    Daily Weight  08/26/24 : (!) 27.8 kg (61 lb 3.2 oz)    Image Results  Point of Care Ultrasound  Gee Tello DO     8/27/2024  8:12 PM    Performed by: Gee Tello DO  Authorized by: Gee Tello DO  Cardiac Indications: hypotension    Procedure: Cardiac Ultrasound    Findings:   Views: parasternal long and parasternal short  The pericardial  space was visualized and was NEGATIVE for a significant   pericardial effusion.  Activity: Ventricular contractions were visualized.  LV: LV systolic function was NORMAL. and low normal ejection fraction    Impression:  Cardiac: The focused cardiac ultrasound exam had ABNORMAL findings as   specified.    Comments: As noted, patient ejection fraction was borderline low-normal  Electrocardiogram, 12-lead PRN ACS symptoms  Sinus rhythm  Short RI interval  Probable anterior infarct, age indeterminate  Prolonged QT interval    See ED provider note for full interpretation and clinical correlation  Confirmed by Cyndy Thomas ([id number]) on 8/27/2024 10:59:05 AM  ECG 12 Lead  Atrial fibrillation  Low voltage, extremity leads  Borderline repolarization abnormality  Baseline wander in lead(s) V4    See ED provider note for full interpretation and clinical correlation  Confirmed by Cyndy Thomas ([id number]) on 8/27/2024 10:57:51 AM  XR chest 1 view  Narrative: STUDY:  Chest Radiograph;  8/27/2024 at 12:53 AM  INDICATION:  Central line placement.  COMPARISON:  XR chest 8/26/2024, XR chest 7/20/2024, XR chest 7/19/2024.  ACCESSION NUMBER(S):  [id number]  ORDERING CLINICIAN:  ORQUIDEA CLAYTON  TECHNIQUE:  Frontal chest was obtained at 0053 hours.  FINDINGS:  CARDIOMEDIASTINAL SILHOUETTE:  Cardiomediastinal silhouette is stable in size and configuration.   Right IJ central line is demonstrated terminating in the region of the  cavoatrial junction     LUNGS:  Large right pleural effusion is again demonstrated with possible  underlying airspace disease in the right lung.  Mild overall pulmonary  vascular congestion is suggested.  Left lung is grossly clear.     ABDOMEN:  No remarkable upper abdominal findings.     BONES:  No acute osseous changes.  Impression: Persistent large right pleural effusion.  Interval placement of right  IJ central line.  No pneumothorax.  Signed by Samson Meade MD  CT angio chest for pulmonary embolism, CT abdomen  pelvis w IV contrast  Narrative: Interpreted By:  Katie Mahoney,   STUDY:  CT ANGIO CHEST FOR PULMONARY EMBOLISM; CT ABDOMEN PELVIS W IV  CONTRAST;  8/27/2024 2:58 am      INDICATION:  Signs/Symptoms:hypoxia, hypoglycemia, recent lung mass.      COMPARISON:  CT chest, abdomen and pelvis 07/19/2024      ACCESSION NUMBER(S):  [id number]; [id number]      ORDERING CLINICIAN:  ORQUIDEA CLAYTON      TECHNIQUE:  Axial CT images of the chest, abdomen and pelvis with coronal and  sagittal reconstructed images obtained after intravenous  administration of contrast. Maximum intensity projection images of  the chest were reconstructed and reviewed.      FINDINGS:  CHEST:      VESSELS: No aortic aneurysm. No pulmonary embolism to the segmental  level. Limited evaluation of subsegmental pulmonary arteries at the  lung bases due to motion artifact. HEART: Normal size. Severe  coronary artery calcifications. No pericardial effusion. MEDIASTINUM  AND DARIUS: No pathologically enlarged thoracic lymph nodes. LUNG,  PLEURA, LARGE AIRWAYS: Stable small right pleural effusion. New small  left pleural effusion. Grossly stable heterogeneous, fat containing  mass in the right lower thorax measuring 9.9 x 9.4 cm. Mucus or  secretions in the right mainstem and lower lobe bronchi. Compressive  atelectasis of the right lower lobe, similar mild compressive  atelectasis of the left lower lobe. New mild ground-glass opacities  in the bilateral upper and left lower lobes. CHEST WALL AND LOWER  NECK: Within normal limits. Subacute fractures of the right 9th, 10th  and 11th ribs.      ABDOMEN:      BONES: Insufficiency type bilateral sacral raul are and body  fractures, new from 07/19/2024. Chronic compression fractures of T12,  L1 and L3. Diffuse degenerative disc changes and lumbar facet  arthropathy. ABDOMINAL WALL: Within normal limits.      LIVER: Mild heterogeneous attenuation of the liver. No discrete mass  or focal parenchymal abnormality. BILE  DUCTS: No biliary dilatation.  GALLBLADDER: Cholelithiasis and gallbladder distension. No  pericholecystic inflammatory stranding. Trace pericholecystic fluid.  PANCREAS: Within normal limits. SPLEEN: Within normal limits.  ADRENALS: Within normal limits.  KIDNEYS AND URETERS: Horseshoe kidney again noted. Symmetric renal  enhancement. No hydronephrosis or perinephric fluid collection.  No  hydroureter.      VESSELS: The aorta and IVC are within normal limits. Severe aortic  calcification. RETROPERITONEUM: No pathologically enlarged lymph  nodes.      PELVIS:      REPRODUCTIVE ORGANS: No pelvic mass or significant free pelvic fluid.  BLADDER: Within normal limits.      BOWEL: No dilated bowel. Colonic diverticulosis without acute  diverticulitis. Normal appendix. PERITONEUM: No ascites or free air,  no fluid collection.      Impression: No pulmonary embolism to the segmental level.      New ground-glass opacities in the bilateral upper lobes and left  lower lobe are nonspecific but may represent pneumonia or aspiration  pneumonitis, particularly given the presence of mucus/secretions in  the right mainstem and lower lobe bronchi.      New small left pleural effusion and mild left lower lobe dependent  atelectasis.      Stable large lipomatous mass in the right lower chest with  compressive atelectasis of the right lower lobe. Stable small right  pleural effusion.      Cholelithiasis, gallbladder distention and trace pericholecystic  fluid. Please correlate for symptoms of acute cholecystitis and  consider further evaluation with ultrasound.      Sacral insufficiency fracture, new since 07/19/2024.      MACRO:  None      Signed by: Katie Mahoney 8/27/2024 3:33 AM  Dictation workstation:   [id number]      Physical Exam  Constitutional:       General: She is in no acute distress.      Appearance: She is ill-appearing, toxic-appearing very minimally responsive to exam.     Comments: Cachectic appearing elderly   female  HENT:      Mouth/Throat:      Mouth: Mucous membranes are dry.      Pharynx: Oropharynx is clear.   Eyes:      General: No scleral icterus.     Extraocular Movements: Extraocular movements intact.      Pupils: Pupils are equal, round, and reactive to light.   Cardiovascular:      Rate and Rhythm: Tachycardia present. Rhythm irregular.      Pulses: Normal pulses.      Heart sounds: Murmur ([place] best at the right lower sternal border/apex) heard.   Pulmonary:      Effort: No respiratory distress present.      Breath sounds: No wheezing or rhonchi present.      Comments: Diminished breath sounds are all lung fields   Abdominal:      General: Abdomen is flat. There is no distension.      Palpations: Abdomen is soft.      Comments: Abdomen is soft without any noted masses/organomegaly, does not appear to be in any signs of distress at this time   Musculoskeletal:      Comments: Could not evaluate given altered mentation, there is notable diffuse muscle wasting   Skin:     General: Skin is slightly cool but not mottled.   Neurological:      Comments: Patient appears somnolent.  Eyes slightly open with no reaction to confrontation or attending to the examiner with name.  On examination and manipulating limbs occasional moaning.  No abnormal or involuntary movements appreciated.  Psychiatric:      Comments: Encephalopathic and not interactive            Relevant Results               Assessment/Plan        This patient has a central line   Reason for the central line remaining today? Parenteral medication    This patient has a urinary catheter   Reason for the urinary catheter remaining today? end-of-life care          Assessment & Plan  A-fib (Multi)    End-of-life care  -Extensive discussion by myself and ED team with the patient's sister Freya Lorenzo as well as the patient's nephew Kalpesh Lorenzo  -Per both these family members there is no other relatives that are within the picture that would make  decisions for the patient at this time  -Please see extensive documentation above with regards to discussion  -Patient's CODE STATUS was changed from DNR to DNR CCO  -Hospice consult was placed  -End-of-life order set was utilized with analgesics, anxiolytics, antipyretics and antisecretory medications  8/27: Awaiting hospice to meet with family.  Appropriate comfort measures instituted.       Sepsis with shock  -Suspect multifactorial in setting of possible pneumonia, underlying gallbladder pathology, cardiac dysfunction and lung mass  -Despite fluid boluses and initiation of 3 pressors plus Solu-Cortef showed very little improvement with regards to her mentation which actually worsened with ever so slight improvement in her pressures  -Blood cultures x 2 were ordered as well as pending UA/urine culture  -Patient was given IV antibiotics with vancomycin and Zosyn  -Further worsening of pressures could be in setting of analgesics/anxiolytics initially required on presentation and in need for central line placement  8/27: No further aggressive treatment in view of plan for hospice.     Pneumonia  Cholecystitis?  -Notable infiltrates on imaging as well as concerns for a distended gallbladder with pericholecystic fluid     Acute hypoxic respiratory failure  -Per discussion with the ED team the patient did desaturated to about 85% requiring 2 to 4 L nasal cannula     Elevated serum lactate  -Suspect multifactorial in setting of underlying respiratory dysfunction, and pneumonia, possible intra-abdominal infectious process, cardiac dysfunction     Lipomatous lung mass with compressive atelectasis  -Would require extensive workup by interventional radiology, thoracic surgery     Osteoporosis  Osteoarthritis  Acute on chronic ambulatory dysfunction  -During previous ED visit was noted to have multiple spinal fractures which were nonoperative     Acute metabolic encephalopathy  -Multifactorial in setting of above     Code  Status: DNR - CCO (per discussion with the patient's sister Freya Lorenzo by both myself and the ED physician             Cam Oquendo MD

## 2024-08-28 NOTE — CONSULTS
Nutrition Initial Assessment:   Nutrition Assessment    Reason for Assessment: Admission nursing screening    Medical history per chart:   74 y.o.  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     8/28:  Pt sleeping, and family at bedside.  Family report patient unable to take PO at this time, and noted poor PO prior to admit. Patient with Hospice Consult.      Nutrition History:     Energy Intake: Poor < 50 %    Current Diet: Adult diet Regular  Average meal Intake during admission: 0%       Nutrition Related Findings:    Teeth: Missing teeth     BM: Last BM Date: 08/27/24  Food allergies: NKFA. has No Known Allergies.  Meds/Labs reviewed.  [Held by provider] hydrocortisone sodium succinate, 100 mg, intravenous, q8h       [Held by provider] amiodarone, 0.5-1 mg/min, Last Rate: Stopped (08/27/24 0546)  [Held by provider] EPINEPHrine, 0-1 mcg/kg/min, Last Rate: Stopped (08/27/24 0546)  [Held by provider] lactated Ringer's, 100 mL/hr, Last Rate: Stopped (08/27/24 0112)  [Held by provider] norepinephrine, 0-0.3 mcg/kg/min, Last Rate: Stopped (08/27/24 0546)  [Held by provider] vasopressin, 0.03 Units/min, Last Rate: Stopped (08/27/24 0546)         Nutrition Significant Labs:    Results from last 7 days   Lab Units 08/27/24  0307 08/26/24  2207   GLUCOSE mg/dL 129* 88   SODIUM mmol/L 143 148*   POTASSIUM mmol/L 3.9 2.2*   CHLORIDE mmol/L 114* 118*   CO2 mmol/L 15* 18*   BUN mg/dL 12 15   CREATININE mg/dL 0.71 0.90   EGFR mL/min/1.73m*2 90 68   CALCIUM mg/dL 7.2* 8.0*   MAGNESIUM mg/dL  --  1.90     No results found for:  "\"HGBA1C\"  Results from last 7 days   Lab Units 08/26/24  2125   POCT GLUCOSE mg/dL 102*       Anthropometrics:  Height: 124.5 cm (4' 1\")   Weight: (!) 27.8 kg (61 lb 3.2 oz)   BMI (Calculated): 17.91  IBW/kg (Dietitian Calculated): 41 kg       Weight History:   Wt Readings from Last 10 Encounters:   08/26/24 (!) 27.8 kg (61 lb 3.2 oz)   07/31/24 (!) 30.8 kg (68 lb)   07/23/24 (!) 31.8 kg (70 lb)   07/20/24 (!) 33.8 kg (74 lb 8.3 oz)        Weight Change %:  Weight History / % Weight Change: Noted a 17.5% loss x 1 month  Significant Weight Loss: Yes  Interpretation of Weight Loss: >5% in 1 month       Nutrition Focused Physical Exam Findings:  defer: With multiple family, but severe wasting per visual  Subcutaneous Fat Loss:      Muscle Wasting:     Edema:     Physical Findings:  Skin: Positive (multiple pressure injuries)    Estimated Needs:   Total Energy Estimated Needs (kCal): 1230 kCal  Method for Estimating Needs: 30 kcal/kg IBW  Total Protein Estimated Needs (g): 62 g  Method for Estimating Needs: 1.5 gm/kg IBW     Method for Estimating Needs: 1ml/kcal        Nutrition Diagnosis   Nutrition Diagnosis:  Malnutrition Diagnosis  Patient has Malnutrition Diagnosis: Yes  Diagnosis Status: New  Malnutrition Diagnosis: Severe malnutrition related to acute disease or injury  As Evidenced by: sig weight loss of 17.5% x 1 month, PO intake <50% for >5 days, severe muscle/fat loss per NFPE    Nutrition Diagnosis  Patient has Nutrition Diagnosis: Yes  Diagnosis Status (1): New  Nutrition Diagnosis 1: Inadequate oral intake  Related to (1): mental status  As Evidenced by (1): Unable to take PO       Nutrition Interventions/Recommendations   Nutrition Interventions and Recommendations:        Nutrition Prescription:  Individualized Nutrition Prescription Provided for : Goals of care - Hospice/comfort        Nutrition Interventions:   Food and/or Nutrient Delivery Interventions  Interventions: Meals and snacks  Goal: Provide " desired items         Nutrition Education:   Education Documentation  No documentation found.      Nutrition Counseling  Counseling Theoretical Approach: Other (Comment)  Goal: RN       Nutrition Monitoring and Evaluation   Monitoring/Evaluation:   Food/Nutrient Related History Monitoring  Monitoring and Evaluation Plan: Energy intake  Energy Intake: Estimated energy intake  Criteria: Goals of care - Hospice    Body Composition/Growth/Weight History  Monitoring and Evaluation Plan: Weight  Weight: Measured weight         Nutrition Focused Physical Findings  Monitoring and Evaluation Plan: Skin  Skin: Impaired wound healing  Criteria: Promote healing            Time Spent/Follow-up Reminder:   Follow Up  Time Spent (min): 35 minutes  Last Date of Nutrition Visit: 08/28/24  Nutrition Follow-Up Needed?: Dietitian to reassess per policy  Follow up Comment: 8/30- check if hospice

## 2024-08-28 NOTE — CARE PLAN
The patient's goals for the shift include  Patients pain will be managed and will remain comfortable    The clinical goals for the shift include Pt comforty needs will be met overnight

## 2024-08-28 NOTE — CARE PLAN
The clinical goals for the shift include Pt comforty needs will be met overnight    Problem: Skin  Goal: Participates in plan/prevention/treatment measures  Outcome: Progressing  Flowsheets (Taken 8/28/2024 0029)  Participates in plan/prevention/treatment measures: Elevate heels  Goal: Prevent/manage excess moisture  Outcome: Progressing  Flowsheets (Taken 8/28/2024 0029)  Prevent/manage excess moisture: Cleanse incontinence/protect with barrier cream  Goal: Prevent/minimize sheer/friction injuries  Outcome: Progressing  Flowsheets (Taken 8/28/2024 0029)  Prevent/minimize sheer/friction injuries:   Use pull sheet   Turn/reposition every 2 hours/use positioning/transfer devices  Goal: Promote skin healing  Outcome: Progressing  Flowsheets (Taken 8/28/2024 0029)  Promote skin healing:   Turn/reposition every 2 hours/use positioning/transfer devices   Protective dressings over bony prominences     Problem: Pain  Goal: Takes deep breaths with improved pain control throughout the shift  Outcome: Progressing  Goal: Walks with improved pain control throughout the shift  Outcome: Progressing  Goal: Free from opioid side effects throughout the shift  Outcome: Progressing  Goal: Free from acute confusion related to pain meds throughout the shift  Outcome: Progressing     Problem: Pain - Adult  Goal: Verbalizes/displays adequate comfort level or baseline comfort level  Outcome: Progressing     Problem: Safety - Adult  Goal: Free from fall injury  Outcome: Progressing

## 2024-08-29 NOTE — SIGNIFICANT EVENT
Preliminary Cause of Death:  · Date and Time of Death DATE: 08/28/24 / TIME: 10:08pm   · Preliminary Cause of Death multi-system organ failure, cardiopulmonary arrest, sepsis with shock, pneumonia   · Comments Code  Status = DNR - CCO   Paged by nursing that patient became unresponsive and had passed away. On physical exam, patient was pale without spontaneous movements and did not respond to physical or verbal stimuli. There was no response to name or painful nailbed pressure. Radial pulses absent, palpated for >60 seconds. There were absent cardiac and lung sounds on all ausculatory fields. Pupils were fixed, dilated, and nonreactive bilaterally. Absent Gag and Corneal reflexes.    Patient pronounced dead at 10:08pm on 08/28/24 .    Family was notified by nursing staff and medical team is available for any questions and concerns during this time.    Yonas Cabrera, DO

## 2024-08-29 NOTE — DISCHARGE SUMMARY
Discharge Diagnosis  A-fib (Multi)    Issues Requiring Follow-Up  Patient     This discharge took greater than 35 minutes.    Test Results Pending At Discharge  Pending Labs       Order Current Status    Blood Culture Preliminary result    Blood Culture Preliminary result            Hospital Course   74 y.o.  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  Patient's weight was 74 pounds as documented on 2024 and is currently at 61 pounds.  Per ED nursing staff documentation the patient was alert and oriented x 3 but upon discussion with the ED physician she was maybe alert and oriented x 2 and provided very basic information such as her name or date of birth and knew that she was in a hospital but did not know where or why she was in the hospital.  Patient was unable to provide any meaningful information about her presentation or histories.  Per discussion with the ED physician he had mentioned that she was notably encephalopathic and he did not feel comfortable with her making her own decisions.  There was extensive discussion between the ED physician and the patient's sister.  EMS had reported that the patient is a DNR and paperwork was received from the facility.  In brief the patient had presented to the ED due to what appeared to be atrial fibrillation with rapid ventricular response with rates in the 130s she was also noted to be hypoglycemic requiring D10 prior to arrival to the hospital.  On arrival the patient was noted to be in atrial fibrillation with rapid ventricular response.  It was noted by  the facility and ED documentation that the patient has become increasingly jaundiced as well.  CBC was notable for a WBC of 7.5, hemoglobin/hematocrit 8.5/26.2, platelet count of 174 with repeat labs noting a white blood cell count of 11.2, hemoglobin of 6.6, platelet count of 223 which was initially thought to be due to sepsis versus delusional effects as well but was ordered 1 unit of packed red blood cells.  She was noted to have abnormal metabolic panel with a sodium 148, potassium of 2.2, BUN/creatinine of 15/0.9, T. bili of 0.9, AST of 13, ALT of 11, magnesium 1.9, BNP of 269, troponin level of 279 then 300 then 327.  Patient was initiated on electrolyte replacement, IV antibiotics with vancomycin/Zosyn, notable elevated serum lactate around 5.  Blood gases with VBG and ABG and follow-ups as noted below with signs of compensation.  CXR noted a persistent large right pleural effusion, status post right IJ placement emergently in the ED for pressor support.  CTA chest noted diffuse groundglass opacities in bilateral upper lobes as well as throughout with concerns for pneumonitis as well as aspiration as well as a left small pleural effusion, stable large lipomatous mass in the right lower chest wall compressive atelectasis within the right lower lobe stable from prior and actually a small stable right pleural effusion making the chest x-ray or indicative of mass, there is also notable cholelithiasis with gallbladder distention and trace.  Cholecystic fluid need to correlate for acute cholecystitis and sacral insufficiency fracture new since 07/19/2024.  Patient showed continued hemodynamic decline with requirements of initiation of pressor support as well as blood products.  Patient was given Solu-Cortef for stress steroids, initiated on epinephrine, Levophed, vasopressin.  With regards to the atrial fibrillation the patient was initiated on amiodarone.  Myself and the ED physician did have an extensive  discussion with the patient's sister.  Initially it was thought that the patient's nephew Kalpesh Lorenzo was the power of  but he had stated to myself and the ED physician that he does not formally have paperwork with this and it was only loosely mentioned in the past but never ever followed through on.  We did reengage with the patient's sister multiple times to update her with regards to the care of her sister, worsening mentation, concerns for sepsis, this enlarged lipomatous mass causing compressive atelectasis, pneumonia and possible biliary dysfunction.  Given her poor nutritional status now only weighing a meters 61 pounds down about 13 pounds from 74 pounds a little over a month ago she is a very very high risk for decompensation from a hemodynamic standpoint, surgical standpoint cardiac standpoint.  She would be a very poor candidate for any surgical intervention with regards to the lipomatous mass as well as at high risk for possible pneumothorax and a high risk for poor healing as well.  It was extensively described the risk versus benefits of continued therapy and aggressive management and at this time she was simply too unstable for any significant intervention at this time/aggressive intervention and the patient's sister Freya Lorenzo had elected to proceed with comfort measures.  I did extensively review what entailed being a full code, DNR, DNR/DNI and DNR CCO and the patient's sister had expressed that if she were to not have any meaningful quality of life or any significant return to her baseline prior to even a month ago that she would likely not want to proceed this way.  Upon extensive reevaluation by both myself and the ED physician the patient has had progressively worsening encephalopathy and inability to make her own decisions at this time.  It was both deemed by myself and the ED physician that given her altered mentation she could not fully grasp the concept of what was  occurring hence the extensive discussion with the patient's sister.  The patient's CODE STATUS was changed from a DNR to a DNR CCO, hospice was consulted and end-of-life order set was utilized with anxiolytics, analgesics, antipyretics, and antisecretory medications.  Pressor support was held as well.  Unfortunately I was unable to obtain any meaningful information from the patient regarding her presentation or management thus far or even during her prior ED visit and hospitalization.  Please see my significant event note from 07/21/2024 and currently the patient is notably much more sicker in appearance and on examination and currently unable to fully assist or participate in her care and decision making at this time.     ED Course (From Provider):        ED Course as of 08/27/24 0618   Mon Aug 26, 2024   2151 Patient twelve-lead EKG interpreted by myself shows atrial fibrillation, rapid ventricular rate of 116, normal axis, normal QRS duration, normal QT, no STEMI. [WJ]   2231 POCT Potassium, Venous(!!): 2.0  Magnesium was added on, IV potassium was ordered.  Hypokalemia could explain the patient's new arrhythmia. [WJ]   2231 XR chest 1 view  Chest x-ray independently reviewed, appears to have persistent opacity in right lung.  Radiology read is pending. [WJ]   2239 HEMOGLOBIN(!): 8.5  Hemoglobin stable when compared to prior [WJ]   2239 WBC: 7.5  Patient did not have a leukocytosis [WJ]   2312 SODIUM(!): 148  Likely dehydrated [WJ]   Tue Aug 27, 2024   0016 Patient just had a A-fib RVR with heart rates up into the 170s.  Aborted with a dose of IV metoprolol.  Will start heparin at this time. [WJ]   0153 Arterial line on left placed, patient hypotensive. Will shock [WJ]   0207 Cardioverted. Still hypotensive, will give stress dose steriod and 3rd pressor start [WJ]   0304 Charge nurse tasked with calling for family members, as my prior attempts have failed.  I did leave voicemails with nephew and sibling around  1AM [WJ]   0316 POCT Lactate, Venous(!!): 5.5 [WJ]   0316 BLOOD GAS VENOUS FULL PANEL(!!)  This result is likely the arterial blood gas. [WJ]   0400 2nd shock [WJ]   0412 I spoke to Dr Brown, recommended continued supportive care antibiotics at this time.  Due to how critically ill the patient is, deferred acute surgical intervention [WJ]   0440 HEMOGLOBIN(!): 6.6  Trauma blood to be given [WJ]   0541 Dr Cabrera discussed with Freya, the sibling. I was at present for phone call. Patient currently does not have capacity. Patient now DNR ml and will initiate comfort measured.  [WJ]            :                  Today the patient appears somnolent and very minimally responsive to exam with occasional groaning.  Family not present at the time of visit.  At this point awaiting hospice meeting with family and likely will be made inpatient hospice here tomorrow.  Likelihood is she may not survive till tomorrow however.  At the time of visit appeared NAD and comfortable on comfort medications.    : Today the patient is seen in the presence of son. At the time of visit she appears very minimally responsive if at all. He describes earlier in the day however she was more awake and at least attentive although not able to speak. He does feel overall she is not in any discomfort. Unable to obtain any ROS due to cognitive status.        addendum:   Patient  2024 at 10:08 PM         End-of-life care  -Extensive discussion by myself and ED team with the patient's sister Freya Lorenzo as well as the patient's nephew Kalpesh Lorenzo  -Per both these family members there is no other relatives that are within the picture that would make decisions for the patient at this time  -Please see extensive documentation above with regards to discussion  -Patient's CODE STATUS was changed from DNR to DNR CCO  -Hospice consult was placed  -End-of-life order set was utilized with analgesics, anxiolytics, antipyretics and  antisecretory medications  : Awaiting hospice to meet with family.  Appropriate comfort measures instituted.  : Patient  2024 at 10:08 PM        Sepsis with shock  -Suspect multifactorial in setting of possible pneumonia, underlying gallbladder pathology, cardiac dysfunction and lung mass  -Despite fluid boluses and initiation of 3 pressors plus Solu-Cortef showed very little improvement with regards to her mentation which actually worsened with ever so slight improvement in her pressures  -Blood cultures x 2 were ordered as well as pending UA/urine culture  -Patient was given IV antibiotics with vancomycin and Zosyn  -Further worsening of pressures could be in setting of analgesics/anxiolytics initially required on presentation and in need for central line placement  : No further aggressive treatment in view of plan for hospice.     Pneumonia  Cholecystitis?  -Notable infiltrates on imaging as well as concerns for a distended gallbladder with pericholecystic fluid     Acute hypoxic respiratory failure  -Per discussion with the ED team the patient did desaturated to about 85% requiring 2 to 4 L nasal cannula     Elevated serum lactate  -Suspect multifactorial in setting of underlying respiratory dysfunction, and pneumonia, possible intra-abdominal infectious process, cardiac dysfunction     Lipomatous lung mass with compressive atelectasis  -Would require extensive workup by interventional radiology, thoracic surgery     Osteoporosis  Osteoarthritis  Acute on chronic ambulatory dysfunction  -During previous ED visit was noted to have multiple spinal fractures which were nonoperative     Acute metabolic encephalopathy  -Multifactorial in setting of above     Code Status: DNR - CCO (per discussion with the patient's sister Freya Lorenzo by both myself and the ED physician                   Pertinent Physical Exam At Time of Discharge  Physical Exam  Patient   Home Medications      Medication List      ASK your doctor about these medications     acetaminophen 650 mg ER tablet; Commonly known as: Tylenol 8 HOUR   beclomethasone HFA 40 mcg/actuation inhaler; Commonly known as: Qvar   calcium carbonate 500 mg calcium (1,250 mg) tablet; Commonly known as:   Oscal   cholecalciferol 50 MCG (2000 UT) tablet; Commonly known as: Vitamin D-3   fluticasone 50 mcg/actuation nasal spray; Commonly known as: Flonase   loratadine 10 mg tablet; Commonly known as: Claritin   lubricating eye drops ophthalmic solution   montelukast 10 mg tablet; Commonly known as: Singulair   multivitamin with minerals iron-free; Commonly known as: Centrum Silver   omeprazole 20 mg DR capsule; Commonly known as: PriLOSEC   predniSONE 10 mg tablet; Commonly known as: Deltasone   PreserVision AREDS-2 250-90-40-1 mg capsule; Generic drug: vit   [id number]       Outpatient Follow-Up  No follow-ups on file.     Cam Oquendo MD

## 2024-08-31 LAB
BACTERIA BLD AEROBE CULT: ABNORMAL
BACTERIA BLD CULT: ABNORMAL
BACTERIA BLD CULT: NORMAL
GRAM STN SPEC: ABNORMAL

## 2024-09-11 ENCOUNTER — APPOINTMENT (OUTPATIENT)
Dept: RADIOLOGY | Facility: HOSPITAL | Age: 74
End: 2024-09-11
Payer: COMMERCIAL

## 2025-05-19 NOTE — PROGRESS NOTES
Kim Gillespie is a 74 y.o. female on day 0 of admission presenting with A-fib (Multi).      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  Patient's weight was 74 pounds as documented on 07/21/2024 and is currently at 61 pounds.  Per ED nursing staff documentation the patient was alert and oriented x 3 but upon discussion with the ED physician she was maybe alert and oriented x 2 and provided very basic information such as her name or date of birth and knew that she was in a hospital but did not know where or why she was in the hospital.  Patient was unable to provide any meaningful information about her presentation or histories.  Per discussion with the ED physician he had mentioned that she was notably encephalopathic and he did not feel comfortable with her making her own decisions.  There was extensive discussion between the ED physician and the patient's sister.  EMS had reported that the patient is a DNR and paperwork was received from the facility.  In brief the patient had presented to the ED due to what appeared to be atrial fibrillation with rapid ventricular response with rates in the 130s she was also noted to be hypoglycemic requiring D10 prior to arrival to the hospital.  On arrival the patient was noted to be in atrial fibrillation with rapid ventricular response.  It was noted by the facility and ED documentation that the patient has become increasingly jaundiced as well.  CBC was notable for a WBC of 7.5, hemoglobin/hematocrit 8.5/26.2, platelet count of 174 with repeat labs noting a  white blood cell count of 11.2, hemoglobin of 6.6, platelet count of 223 which was initially thought to be due to sepsis versus delusional effects as well but was ordered 1 unit of packed red blood cells.  She was noted to have abnormal metabolic panel with a sodium 148, potassium of 2.2, BUN/creatinine of 15/0.9, T. bili of 0.9, AST of 13, ALT of 11, magnesium 1.9, BNP of 269, troponin level of 279 then 300 then 327.  Patient was initiated on electrolyte replacement, IV antibiotics with vancomycin/Zosyn, notable elevated serum lactate around 5.  Blood gases with VBG and ABG and follow-ups as noted below with signs of compensation.  CXR noted a persistent large right pleural effusion, status post right IJ placement emergently in the ED for pressor support.  CTA chest noted diffuse groundglass opacities in bilateral upper lobes as well as throughout with concerns for pneumonitis as well as aspiration as well as a left small pleural effusion, stable large lipomatous mass in the right lower chest wall compressive atelectasis within the right lower lobe stable from prior and actually a small stable right pleural effusion making the chest x-ray or indicative of mass, there is also notable cholelithiasis with gallbladder distention and trace.  Cholecystic fluid need to correlate for acute cholecystitis and sacral insufficiency fracture new since 07/19/2024.  Patient showed continued hemodynamic decline with requirements of initiation of pressor support as well as blood products.  Patient was given Solu-Cortef for stress steroids, initiated on epinephrine, Levophed, vasopressin.  With regards to the atrial fibrillation the patient was initiated on amiodarone.  Myself and the ED physician did have an extensive discussion with the patient's sister.  Initially it was thought that the patient's nephew Kalpesh Lorenzo was the power of  but he had stated to myself and the ED physician that he does not formally have  paperwork with this and it was only loosely mentioned in the past but never ever followed through on.  We did reengage with the patient's sister multiple times to update her with regards to the care of her sister, worsening mentation, concerns for sepsis, this enlarged lipomatous mass causing compressive atelectasis, pneumonia and possible biliary dysfunction.  Given her poor nutritional status now only weighing a meters 61 pounds down about 13 pounds from 74 pounds a little over a month ago she is a very very high risk for decompensation from a hemodynamic standpoint, surgical standpoint cardiac standpoint.  She would be a very poor candidate for any surgical intervention with regards to the lipomatous mass as well as at high risk for possible pneumothorax and a high risk for poor healing as well.  It was extensively described the risk versus benefits of continued therapy and aggressive management and at this time she was simply too unstable for any significant intervention at this time/aggressive intervention and the patient's sister Freya Lorenzo had elected to proceed with comfort measures.  I did extensively review what entailed being a full code, DNR, DNR/DNI and DNR CCO and the patient's sister had expressed that if she were to not have any meaningful quality of life or any significant return to her baseline prior to even a month ago that she would likely not want to proceed this way.  Upon extensive reevaluation by both myself and the ED physician the patient has had progressively worsening encephalopathy and inability to make her own decisions at this time.  It was both deemed by myself and the ED physician that given her altered mentation she could not fully grasp the concept of what was occurring hence the extensive discussion with the patient's sister.  The patient's CODE STATUS was changed from a DNR to a DNR CCO, hospice was consulted and end-of-life order set was utilized with anxiolytics, analgesics,  antipyretics, and antisecretory medications.  Pressor support was held as well.  Unfortunately I was unable to obtain any meaningful information from the patient regarding her presentation or management thus far or even during her prior ED visit and hospitalization.  Please see my significant event note from 07/21/2024 and currently the patient is notably much more sicker in appearance and on examination and currently unable to fully assist or participate in her care and decision making at this time.     ED Course (From Provider):      ED Course as of 08/27/24 0618   Mon Aug 26, 2024   2151 Patient twelve-lead EKG interpreted by myself shows atrial fibrillation, rapid ventricular rate of 116, normal axis, normal QRS duration, normal QT, no STEMI. [WJ]   2231 POCT Potassium, Venous(!!): 2.0  Magnesium was added on, IV potassium was ordered.  Hypokalemia could explain the patient's new arrhythmia. [WJ]   2231 XR chest 1 view  Chest x-ray independently reviewed, appears to have persistent opacity in right lung.  Radiology read is pending. [WJ]   2239 HEMOGLOBIN(!): 8.5  Hemoglobin stable when compared to prior [WJ]   2239 WBC: 7.5  Patient did not have a leukocytosis [WJ]   2312 SODIUM(!): 148  Likely dehydrated [WJ]   Tue Aug 27, 2024   0016 Patient just had a A-fib RVR with heart rates up into the 170s.  Aborted with a dose of IV metoprolol.  Will start heparin at this time. [WJ]   0153 Arterial line on left placed, patient hypotensive. Will shock [WJ]   0207 Cardioverted. Still hypotensive, will give stress dose steriod and 3rd pressor start [WJ]   0304 Charge nurse tasked with calling for family members, as my prior attempts have failed.  I did leave voicemails with nephew and sibling around 1AM [WJ]   0316 POCT Lactate, Venous(!!): 5.5 [WJ]   0316 BLOOD GAS VENOUS FULL PANEL(!!)  This result is likely the arterial blood gas. [WJ]   0400 2nd shock [WJ]   0412 I spoke to Dr Brown, recommended continued supportive  care antibiotics at this time.  Due to how critically ill the patient is, deferred acute surgical intervention [WJ]   2740 HEMOGLOBIN(!): 6.6  Trauma blood to be given [WJ]   0595 Dr Cabrera discussed with Freya, the sibling. I was at present for phone call. Patient currently does not have capacity. Patient now DNR ml and will initiate comfort measured.  [WJ]         8/27:                  Today the patient appears somnolent and very minimally responsive to exam with occasional groaning.  Family not present at the time of visit.  At this point awaiting hospice meeting with family and likely will be made inpatient hospice here tomorrow.  Likelihood is she may not survive till tomorrow however.  At the time of visit appeared NAD and comfortable on comfort medications.       Objective     Last Recorded Vitals  BP 76/54 (BP Location: Right arm, Patient Position: Lying)   Pulse 89   Temp 36.5 °C (97.7 °F) (Temporal)   Resp 18   Wt (!) 27.8 kg (61 lb 3.2 oz)   SpO2 90%   Intake/Output last 3 Shifts:    Intake/Output Summary (Last 24 hours) at 8/27/2024 1829  Last data filed at 8/27/2024 0915  Gross per 24 hour   Intake 1572.91 ml   Output --   Net 1572.91 ml       Admission Weight  Weight: (!) 27.8 kg (61 lb 3.2 oz) (08/26/24 2129)    Daily Weight  08/26/24 : (!) 27.8 kg (61 lb 3.2 oz)    Image Results  Electrocardiogram, 12-lead PRN ACS symptoms  Sinus rhythm  Short WV interval  Probable anterior infarct, age indeterminate  Prolonged QT interval    See ED provider note for full interpretation and clinical correlation  Confirmed by Cyndy Thomas ([id number]) on 8/27/2024 10:59:05 AM  ECG 12 Lead  Atrial fibrillation  Low voltage, extremity leads  Borderline repolarization abnormality  Baseline wander in lead(s) V4    See ED provider note for full interpretation and clinical correlation  Confirmed by Cyndy Thomas ([id number]) on 8/27/2024 10:57:51 AM  XR chest 1 view  Narrative: STUDY:  Chest Radiograph;  8/27/2024 at 12:53  AM  INDICATION:  Central line placement.  COMPARISON:  XR chest 8/26/2024, XR chest 7/20/2024, XR chest 7/19/2024.  ACCESSION NUMBER(S):  [id number]  ORDERING CLINICIAN:  ORQUIDEA CLAYTON  TECHNIQUE:  Frontal chest was obtained at 0053 hours.  FINDINGS:  CARDIOMEDIASTINAL SILHOUETTE:  Cardiomediastinal silhouette is stable in size and configuration.   Right IJ central line is demonstrated terminating in the region of the  cavoatrial junction     LUNGS:  Large right pleural effusion is again demonstrated with possible  underlying airspace disease in the right lung.  Mild overall pulmonary  vascular congestion is suggested.  Left lung is grossly clear.     ABDOMEN:  No remarkable upper abdominal findings.     BONES:  No acute osseous changes.  Impression: Persistent large right pleural effusion.  Interval placement of right  IJ central line.  No pneumothorax.  Signed by Samson Meade MD  CT angio chest for pulmonary embolism, CT abdomen pelvis w IV contrast  Narrative: Interpreted By:  Katie Mahoney,   STUDY:  CT ANGIO CHEST FOR PULMONARY EMBOLISM; CT ABDOMEN PELVIS W IV  CONTRAST;  8/27/2024 2:58 am      INDICATION:  Signs/Symptoms:hypoxia, hypoglycemia, recent lung mass.      COMPARISON:  CT chest, abdomen and pelvis 07/19/2024      ACCESSION NUMBER(S):  [id number]; [id number]      ORDERING CLINICIAN:  ORQUIDEA CLAYTON      TECHNIQUE:  Axial CT images of the chest, abdomen and pelvis with coronal and  sagittal reconstructed images obtained after intravenous  administration of contrast. Maximum intensity projection images of  the chest were reconstructed and reviewed.      FINDINGS:  CHEST:      VESSELS: No aortic aneurysm. No pulmonary embolism to the segmental  level. Limited evaluation of subsegmental pulmonary arteries at the  lung bases due to motion artifact. HEART: Normal size. Severe  coronary artery calcifications. No pericardial effusion. MEDIASTINUM  AND DARIUS: No pathologically enlarged thoracic lymph  nodes. LUNG,  PLEURA, LARGE AIRWAYS: Stable small right pleural effusion. New small  left pleural effusion. Grossly stable heterogeneous, fat containing  mass in the right lower thorax measuring 9.9 x 9.4 cm. Mucus or  secretions in the right mainstem and lower lobe bronchi. Compressive  atelectasis of the right lower lobe, similar mild compressive  atelectasis of the left lower lobe. New mild ground-glass opacities  in the bilateral upper and left lower lobes. CHEST WALL AND LOWER  NECK: Within normal limits. Subacute fractures of the right 9th, 10th  and 11th ribs.      ABDOMEN:      BONES: Insufficiency type bilateral sacral raul are and body  fractures, new from 07/19/2024. Chronic compression fractures of T12,  L1 and L3. Diffuse degenerative disc changes and lumbar facet  arthropathy. ABDOMINAL WALL: Within normal limits.      LIVER: Mild heterogeneous attenuation of the liver. No discrete mass  or focal parenchymal abnormality. BILE DUCTS: No biliary dilatation.  GALLBLADDER: Cholelithiasis and gallbladder distension. No  pericholecystic inflammatory stranding. Trace pericholecystic fluid.  PANCREAS: Within normal limits. SPLEEN: Within normal limits.  ADRENALS: Within normal limits.  KIDNEYS AND URETERS: Horseshoe kidney again noted. Symmetric renal  enhancement. No hydronephrosis or perinephric fluid collection.  No  hydroureter.      VESSELS: The aorta and IVC are within normal limits. Severe aortic  calcification. RETROPERITONEUM: No pathologically enlarged lymph  nodes.      PELVIS:      REPRODUCTIVE ORGANS: No pelvic mass or significant free pelvic fluid.  BLADDER: Within normal limits.      BOWEL: No dilated bowel. Colonic diverticulosis without acute  diverticulitis. Normal appendix. PERITONEUM: No ascites or free air,  no fluid collection.      Impression: No pulmonary embolism to the segmental level.      New ground-glass opacities in the bilateral upper lobes and left  lower lobe are nonspecific but  may represent pneumonia or aspiration  pneumonitis, particularly given the presence of mucus/secretions in  the right mainstem and lower lobe bronchi.      New small left pleural effusion and mild left lower lobe dependent  atelectasis.      Stable large lipomatous mass in the right lower chest with  compressive atelectasis of the right lower lobe. Stable small right  pleural effusion.      Cholelithiasis, gallbladder distention and trace pericholecystic  fluid. Please correlate for symptoms of acute cholecystitis and  consider further evaluation with ultrasound.      Sacral insufficiency fracture, new since 07/19/2024.      MACRO:  None      Signed by: Katie Mahoney 8/27/2024 3:33 AM  Dictation workstation:   [id number]      Physical Exam  Constitutional:       General: She is in no acute distress.      Appearance: She is ill-appearing, toxic-appearing very minimally responsive to exam.     Comments: Cachectic appearing elderly  female  HENT:      Mouth/Throat:      Mouth: Mucous membranes are dry.      Pharynx: Oropharynx is clear.   Eyes:      General: No scleral icterus.     Extraocular Movements: Extraocular movements intact.      Pupils: Pupils are equal, round, and reactive to light.   Cardiovascular:      Rate and Rhythm: Tachycardia present. Rhythm irregular.      Pulses: Normal pulses.      Heart sounds: Murmur (Wolfe best at the right lower sternal border/apex) heard.   Pulmonary:      Effort: No respiratory distress present.      Breath sounds: No wheezing or rhonchi present.      Comments: Diminished breath sounds are all lung fields   Abdominal:      General: Abdomen is flat. There is no distension.      Palpations: Abdomen is soft.      Comments: Abdomen is soft without any noted masses/organomegaly, does not appear to be in any signs of distress at this time   Musculoskeletal:      Comments: Could not evaluate given altered mentation, there is notable diffuse muscle wasting   Skin:      General: Skin is warm.   Neurological:      Comments: Patient appears somnolent.  Eyes slightly open with no reaction to confrontation or attending to the examiner with name.  On examination and manipulating limbs occasional moaning.  No abnormal or involuntary movements appreciated.  Psychiatric:      Comments: Encephalopathic and not interactive            Relevant Results               Assessment/Plan        This patient has a central line   Reason for the central line remaining today? Parenteral medication    This patient has a urinary catheter   Reason for the urinary catheter remaining today? end-of-life care          Assessment & Plan  A-fib (Multi)    End-of-life care  -Extensive discussion by myself and ED team with the patient's sister Freya Lorenzo as well as the patient's nephew Kalpesh Lorenzo  -Per both these family members there is no other relatives that are within the picture that would make decisions for the patient at this time  -Please see extensive documentation above with regards to discussion  -Patient's CODE STATUS was changed from DNR to DNR CCO  -Hospice consult was placed  -End-of-life order set was utilized with analgesics, anxiolytics, antipyretics and antisecretory medications  8/27: Awaiting hospice to meet with family.  Appropriate comfort measures instituted.     Sepsis with shock  -Suspect multifactorial in setting of possible pneumonia, underlying gallbladder pathology, cardiac dysfunction and lung mass  -Despite fluid boluses and initiation of 3 pressors plus Solu-Cortef showed very little improvement with regards to her mentation which actually worsened with ever so slight improvement in her pressures  -Blood cultures x 2 were ordered as well as pending UA/urine culture  -Patient was given IV antibiotics with vancomycin and Zosyn  -Further worsening of pressures could be in setting of analgesics/anxiolytics initially required on presentation and in need for central line  placement  8/27: No further aggressive treatment in view of plan for hospice.     Pneumonia  Cholecystitis?  -Notable infiltrates on imaging as well as concerns for a distended gallbladder with pericholecystic fluid     Acute hypoxic respiratory failure  -Per discussion with the ED team the patient did desaturated to about 85% requiring 2 to 4 L nasal cannula     Elevated serum lactate  -Suspect multifactorial in setting of underlying respiratory dysfunction, and pneumonia, possible intra-abdominal infectious process, cardiac dysfunction     Lipomatous lung mass with compressive atelectasis  -Would require extensive workup by interventional radiology, thoracic surgery     Osteoporosis  Osteoarthritis  Acute on chronic ambulatory dysfunction  -During previous ED visit was noted to have multiple spinal fractures which were nonoperative     Acute metabolic encephalopathy  -Multifactorial in setting of above     Code Status: DNR - CCO (per discussion with the patient's sister Freya Lorenzo by both myself and the ED physician              Cam Oquendo MD       none